# Patient Record
Sex: MALE | Race: WHITE | Employment: OTHER | URBAN - NONMETROPOLITAN AREA
[De-identification: names, ages, dates, MRNs, and addresses within clinical notes are randomized per-mention and may not be internally consistent; named-entity substitution may affect disease eponyms.]

---

## 2020-08-14 ENCOUNTER — HOSPITAL ENCOUNTER (INPATIENT)
Age: 85
LOS: 7 days | Discharge: NURSING, REHAB OR LONG-TERM CARE | DRG: 964 | End: 2020-08-21
Attending: FAMILY MEDICINE | Admitting: SURGERY
Payer: MEDICARE

## 2020-08-14 PROBLEM — R58 RETROPERITONEAL BLEEDING: Status: ACTIVE | Noted: 2020-08-14

## 2020-08-14 LAB
ABO: NORMAL
ACT TEG: 113 SECONDS (ref 86–118)
ANGLE, RAPID TEG: 75.8 DEG (ref 64–80)
ANION GAP SERPL CALCULATED.3IONS-SCNC: 12 MEQ/L (ref 8–16)
ANISOCYTOSIS: PRESENT
ANTIBODY SCREEN: NORMAL
APTT: 30.4 SECONDS (ref 22–38)
BASOPHILIA: ABNORMAL
BASOPHILS # BLD: 0.3 %
BASOPHILS ABSOLUTE: 0 THOU/MM3 (ref 0–0.1)
BUN BLDV-MCNC: 49 MG/DL (ref 7–22)
CALCIUM SERPL-MCNC: 7.5 MG/DL (ref 8.5–10.5)
CHLORIDE BLD-SCNC: 108 MEQ/L (ref 98–111)
CO2: 22 MEQ/L (ref 23–33)
CREAT SERPL-MCNC: 2.3 MG/DL (ref 0.4–1.2)
CRENATED RBC'S: ABNORMAL
DACROCYTES: ABNORMAL
EOSINOPHIL # BLD: 0 %
EOSINOPHILS ABSOLUTE: 0 THOU/MM3 (ref 0–0.4)
EPL-TEG: 0 % (ref 0–15)
ERYTHROCYTE [DISTWIDTH] IN BLOOD BY AUTOMATED COUNT: 102.4 FL (ref 35–45)
ERYTHROCYTE [DISTWIDTH] IN BLOOD BY AUTOMATED COUNT: 26.1 % (ref 11.5–14.5)
GFR SERPL CREATININE-BSD FRML MDRD: 27 ML/MIN/1.73M2
GLUCOSE BLD-MCNC: 123 MG/DL (ref 70–108)
HCT VFR BLD CALC: 23.7 % (ref 42–52)
HCT VFR BLD CALC: 24.3 % (ref 42–52)
HEMOGLOBIN: 7.3 GM/DL (ref 14–18)
HEMOGLOBIN: 7.4 GM/DL (ref 14–18)
HEPARIN THERAPY: NO
HYPOCHROMIA: PRESENT
IMMATURE GRANS (ABS): 0.07 THOU/MM3 (ref 0–0.07)
IMMATURE GRANULOCYTES: 0.6 %
INR BLD: 1.51 (ref 0.85–1.13)
KINETICS RAPID TEG: 1.2 MINUTES (ref 0.5–2.3)
LACTIC ACID: 2.2 MMOL/L (ref 0.5–2.2)
LY30 (LYSIS) TEG: 0 % (ref 0–7.5)
LYMPHOCYTES # BLD: 4.7 %
LYMPHOCYTES ABSOLUTE: 0.5 THOU/MM3 (ref 1–4.8)
MA(MAX CLOT) RAPID TEG: 63.1 MM (ref 52–71)
MCH RBC QN AUTO: 32.9 PG (ref 26–33)
MCHC RBC AUTO-ENTMCNC: 30.8 GM/DL (ref 32.2–35.5)
MCV RBC AUTO: 106.8 FL (ref 80–94)
MONOCYTES # BLD: 11.6 %
MONOCYTES ABSOLUTE: 1.3 THOU/MM3 (ref 0.4–1.3)
MRSA SCREEN RT-PCR: NEGATIVE
NUCLEATED RED BLOOD CELLS: 0 /100 WBC
OSMOLALITY CALCULATION: 297.5 MOSMOL/KG (ref 275–300)
PLATELET # BLD: 160 THOU/MM3 (ref 130–400)
PLATELET ESTIMATE: ADEQUATE
PMV BLD AUTO: 12.2 FL (ref 9.4–12.4)
POTASSIUM SERPL-SCNC: 4.6 MEQ/L (ref 3.5–5.2)
RBC # BLD: 2.22 MILL/MM3 (ref 4.7–6.1)
REACTION TIME RAPID TEG: 0.7 MINUTES (ref 0.4–1)
RH FACTOR: NORMAL
SCAN OF BLOOD SMEAR: NORMAL
SEG NEUTROPHILS: 82.8 %
SEGMENTED NEUTROPHILS ABSOLUTE COUNT: 9 THOU/MM3 (ref 1.8–7.7)
SODIUM BLD-SCNC: 142 MEQ/L (ref 135–145)
VANCOMYCIN RESISTANT ENTEROCOCCUS: NEGATIVE
WBC # BLD: 10.9 THOU/MM3 (ref 4.8–10.8)

## 2020-08-14 PROCEDURE — 86901 BLOOD TYPING SEROLOGIC RH(D): CPT

## 2020-08-14 PROCEDURE — 99222 1ST HOSP IP/OBS MODERATE 55: CPT | Performed by: SURGERY

## 2020-08-14 PROCEDURE — 86900 BLOOD TYPING SEROLOGIC ABO: CPT

## 2020-08-14 PROCEDURE — 86850 RBC ANTIBODY SCREEN: CPT

## 2020-08-14 PROCEDURE — 6820000003 HC L2 TRAUMA ALERT ACTIVATION: Performed by: SURGERY

## 2020-08-14 PROCEDURE — 80048 BASIC METABOLIC PNL TOTAL CA: CPT

## 2020-08-14 PROCEDURE — 86923 COMPATIBILITY TEST ELECTRIC: CPT

## 2020-08-14 PROCEDURE — 85025 COMPLETE CBC W/AUTO DIFF WBC: CPT

## 2020-08-14 PROCEDURE — APPSS180 APP SPLIT SHARED TIME > 60 MINUTES: Performed by: PHYSICIAN ASSISTANT

## 2020-08-14 PROCEDURE — 2500000003 HC RX 250 WO HCPCS: Performed by: PHYSICIAN ASSISTANT

## 2020-08-14 PROCEDURE — 2580000003 HC RX 258: Performed by: FAMILY MEDICINE

## 2020-08-14 PROCEDURE — 85014 HEMATOCRIT: CPT

## 2020-08-14 PROCEDURE — 2580000003 HC RX 258: Performed by: PHYSICIAN ASSISTANT

## 2020-08-14 PROCEDURE — 83605 ASSAY OF LACTIC ACID: CPT

## 2020-08-14 PROCEDURE — 87081 CULTURE SCREEN ONLY: CPT

## 2020-08-14 PROCEDURE — 6360000002 HC RX W HCPCS

## 2020-08-14 PROCEDURE — 85730 THROMBOPLASTIN TIME PARTIAL: CPT

## 2020-08-14 PROCEDURE — 99285 EMERGENCY DEPT VISIT HI MDM: CPT

## 2020-08-14 PROCEDURE — P9016 RBC LEUKOCYTES REDUCED: HCPCS

## 2020-08-14 PROCEDURE — 87500 VANOMYCIN DNA AMP PROBE: CPT

## 2020-08-14 PROCEDURE — 36415 COLL VENOUS BLD VENIPUNCTURE: CPT

## 2020-08-14 PROCEDURE — 2000000000 HC ICU R&B

## 2020-08-14 PROCEDURE — 76705 ECHO EXAM OF ABDOMEN: CPT

## 2020-08-14 PROCEDURE — 87086 URINE CULTURE/COLONY COUNT: CPT

## 2020-08-14 PROCEDURE — 85018 HEMOGLOBIN: CPT

## 2020-08-14 PROCEDURE — 99222 1ST HOSP IP/OBS MODERATE 55: CPT | Performed by: INTERNAL MEDICINE

## 2020-08-14 PROCEDURE — 85610 PROTHROMBIN TIME: CPT

## 2020-08-14 PROCEDURE — 87641 MR-STAPH DNA AMP PROBE: CPT

## 2020-08-14 RX ORDER — POLYETHYLENE GLYCOL 3350 17 G/17G
17 POWDER, FOR SOLUTION ORAL DAILY
Status: ON HOLD | COMMUNITY
End: 2020-08-21 | Stop reason: HOSPADM

## 2020-08-14 RX ORDER — DOCUSATE SODIUM 100 MG/1
100 CAPSULE, LIQUID FILLED ORAL DAILY
Status: DISCONTINUED | OUTPATIENT
Start: 2020-08-15 | End: 2020-08-21 | Stop reason: HOSPADM

## 2020-08-14 RX ORDER — LEVOTHYROXINE SODIUM 0.1 MG/1
100 TABLET ORAL DAILY
Status: DISCONTINUED | OUTPATIENT
Start: 2020-08-15 | End: 2020-08-21 | Stop reason: HOSPADM

## 2020-08-14 RX ORDER — WARFARIN SODIUM 5 MG/1
5 TABLET ORAL DAILY
Status: ON HOLD | COMMUNITY
End: 2020-08-21 | Stop reason: HOSPADM

## 2020-08-14 RX ORDER — SODIUM CHLORIDE 0.9 % (FLUSH) 0.9 %
10 SYRINGE (ML) INJECTION EVERY 12 HOURS SCHEDULED
Status: DISCONTINUED | OUTPATIENT
Start: 2020-08-14 | End: 2020-08-21 | Stop reason: HOSPADM

## 2020-08-14 RX ORDER — ALLOPURINOL 100 MG/1
100 TABLET ORAL DAILY
Status: DISCONTINUED | OUTPATIENT
Start: 2020-08-15 | End: 2020-08-21 | Stop reason: HOSPADM

## 2020-08-14 RX ORDER — POTASSIUM CITRATE 10 MEQ/1
10 TABLET, EXTENDED RELEASE ORAL DAILY
Status: ON HOLD | COMMUNITY
End: 2020-08-21 | Stop reason: HOSPADM

## 2020-08-14 RX ORDER — ATORVASTATIN CALCIUM 20 MG/1
20 TABLET, FILM COATED ORAL DAILY
Status: DISCONTINUED | OUTPATIENT
Start: 2020-08-15 | End: 2020-08-21 | Stop reason: HOSPADM

## 2020-08-14 RX ORDER — FENTANYL CITRATE 50 UG/ML
25 INJECTION, SOLUTION INTRAMUSCULAR; INTRAVENOUS
Status: DISCONTINUED | OUTPATIENT
Start: 2020-08-14 | End: 2020-08-21 | Stop reason: HOSPADM

## 2020-08-14 RX ORDER — FENTANYL CITRATE 50 UG/ML
INJECTION, SOLUTION INTRAMUSCULAR; INTRAVENOUS
Status: COMPLETED
Start: 2020-08-14 | End: 2020-08-14

## 2020-08-14 RX ORDER — ASPIRIN 81 MG/1
81 TABLET ORAL NIGHTLY
Status: ON HOLD | COMMUNITY
End: 2020-08-21 | Stop reason: HOSPADM

## 2020-08-14 RX ORDER — MECLIZINE HCL 12.5 MG/1
12.5 TABLET ORAL 3 TIMES DAILY PRN
COMMUNITY

## 2020-08-14 RX ORDER — SODIUM CHLORIDE 9 MG/ML
INJECTION, SOLUTION INTRAVENOUS CONTINUOUS
Status: DISCONTINUED | OUTPATIENT
Start: 2020-08-14 | End: 2020-08-16

## 2020-08-14 RX ORDER — ACETAMINOPHEN 325 MG/1
650 TABLET ORAL EVERY 4 HOURS PRN
Status: DISCONTINUED | OUTPATIENT
Start: 2020-08-14 | End: 2020-08-21 | Stop reason: HOSPADM

## 2020-08-14 RX ORDER — DOCUSATE SODIUM 100 MG/1
100 CAPSULE, LIQUID FILLED ORAL DAILY
Status: ON HOLD | COMMUNITY
End: 2020-08-21 | Stop reason: HOSPADM

## 2020-08-14 RX ORDER — LEVOTHYROXINE SODIUM 0.1 MG/1
100 TABLET ORAL DAILY
Status: ON HOLD | COMMUNITY
End: 2020-08-21 | Stop reason: HOSPADM

## 2020-08-14 RX ORDER — SODIUM CHLORIDE 0.9 % (FLUSH) 0.9 %
10 SYRINGE (ML) INJECTION PRN
Status: DISCONTINUED | OUTPATIENT
Start: 2020-08-14 | End: 2020-08-21 | Stop reason: HOSPADM

## 2020-08-14 RX ORDER — PROMETHAZINE HYDROCHLORIDE 25 MG/1
12.5 TABLET ORAL EVERY 6 HOURS PRN
Status: DISCONTINUED | OUTPATIENT
Start: 2020-08-14 | End: 2020-08-21 | Stop reason: HOSPADM

## 2020-08-14 RX ORDER — BUMETANIDE 2 MG/1
2 TABLET ORAL
Status: ON HOLD | COMMUNITY
End: 2020-08-21 | Stop reason: HOSPADM

## 2020-08-14 RX ORDER — POLYETHYLENE GLYCOL 3350 17 G/17G
17 POWDER, FOR SOLUTION ORAL DAILY PRN
Status: DISCONTINUED | OUTPATIENT
Start: 2020-08-14 | End: 2020-08-21 | Stop reason: HOSPADM

## 2020-08-14 RX ORDER — ZOLPIDEM TARTRATE 5 MG/1
5 TABLET ORAL NIGHTLY
Status: ON HOLD | COMMUNITY
End: 2020-08-21 | Stop reason: HOSPADM

## 2020-08-14 RX ORDER — ACETAMINOPHEN 500 MG
500 TABLET ORAL EVERY 6 HOURS PRN
Status: DISCONTINUED | OUTPATIENT
Start: 2020-08-14 | End: 2020-08-14 | Stop reason: ALTCHOICE

## 2020-08-14 RX ORDER — AMIODARONE HYDROCHLORIDE 200 MG/1
400 TABLET ORAL DAILY
COMMUNITY

## 2020-08-14 RX ORDER — ALLOPURINOL 100 MG/1
100 TABLET ORAL DAILY
COMMUNITY

## 2020-08-14 RX ORDER — FENTANYL CITRATE 50 UG/ML
25 INJECTION, SOLUTION INTRAMUSCULAR; INTRAVENOUS ONCE
Status: COMPLETED | OUTPATIENT
Start: 2020-08-14 | End: 2020-08-14

## 2020-08-14 RX ORDER — NITROGLYCERIN 0.4 MG/1
0.4 TABLET SUBLINGUAL EVERY 5 MIN PRN
COMMUNITY

## 2020-08-14 RX ORDER — AMIODARONE HYDROCHLORIDE 200 MG/1
400 TABLET ORAL DAILY
Status: DISCONTINUED | OUTPATIENT
Start: 2020-08-15 | End: 2020-08-21 | Stop reason: HOSPADM

## 2020-08-14 RX ORDER — ACETAMINOPHEN 500 MG
500 TABLET ORAL EVERY 6 HOURS PRN
COMMUNITY

## 2020-08-14 RX ORDER — SODIUM CHLORIDE 9 MG/ML
INJECTION, SOLUTION INTRAVENOUS CONTINUOUS
Status: DISCONTINUED | OUTPATIENT
Start: 2020-08-14 | End: 2020-08-14

## 2020-08-14 RX ORDER — ATORVASTATIN CALCIUM 20 MG/1
20 TABLET, FILM COATED ORAL DAILY
COMMUNITY

## 2020-08-14 RX ORDER — ONDANSETRON 2 MG/ML
4 INJECTION INTRAMUSCULAR; INTRAVENOUS EVERY 6 HOURS PRN
Status: DISCONTINUED | OUTPATIENT
Start: 2020-08-14 | End: 2020-08-21 | Stop reason: HOSPADM

## 2020-08-14 RX ORDER — BUMETANIDE 1 MG/1
1 TABLET ORAL
Status: ON HOLD | COMMUNITY
End: 2020-08-21 | Stop reason: HOSPADM

## 2020-08-14 RX ADMIN — FENTANYL CITRATE 25 MCG: 50 INJECTION, SOLUTION INTRAMUSCULAR; INTRAVENOUS at 16:03

## 2020-08-14 RX ADMIN — SODIUM CHLORIDE: 9 INJECTION, SOLUTION INTRAVENOUS at 16:01

## 2020-08-14 RX ADMIN — SODIUM CHLORIDE: 9 INJECTION, SOLUTION INTRAVENOUS at 18:02

## 2020-08-14 RX ADMIN — FAMOTIDINE 20 MG: 10 INJECTION INTRAVENOUS at 21:00

## 2020-08-14 RX ADMIN — Medication 10 ML: at 21:00

## 2020-08-14 ASSESSMENT — ENCOUNTER SYMPTOMS
SHORTNESS OF BREATH: 0
ABDOMINAL PAIN: 1

## 2020-08-14 ASSESSMENT — PAIN SCALES - GENERAL
PAINLEVEL_OUTOF10: 5
PAINLEVEL_OUTOF10: 0

## 2020-08-14 NOTE — ED PROVIDER NOTES
history on file. SURGICAL HISTORY      has no past surgical history on file. CURRENT MEDICATIONS       Current Discharge Medication List      CONTINUE these medications which have NOT CHANGED    Details   polyethylene glycol (GLYCOLAX) 17 g packet Take 17 g by mouth daily      allopurinol (ZYLOPRIM) 100 MG tablet Take 100 mg by mouth daily      atorvastatin (LIPITOR) 20 MG tablet Take 20 mg by mouth daily      metoprolol tartrate (LOPRESSOR) 25 MG tablet Take 25 mg by mouth 2 times daily Hold if SBP <100 or DBP<60      docusate sodium (COLACE) 100 MG capsule Take 100 mg by mouth daily      levothyroxine (SYNTHROID) 100 MCG tablet Take 100 mcg by mouth Daily      acetaminophen (TYLENOL) 500 MG tablet Take 500 mg by mouth every 6 hours as needed for Pain      meclizine (ANTIVERT) 12.5 MG tablet Take 12.5 mg by mouth 3 times daily as needed for Dizziness      warfarin (COUMADIN) 5 MG tablet Take 5 mg by mouth daily      !! bumetanide (BUMEX) 2 MG tablet Take 2 mg by mouth daily      !! bumetanide (BUMEX) 1 MG tablet Take 1 mg by mouth Daily with lunch      zolpidem (AMBIEN) 5 MG tablet Take 5 mg by mouth nightly. nitroGLYCERIN (NITROSTAT) 0.4 MG SL tablet Place 0.4 mg under the tongue every 5 minutes as needed for Chest pain up to max of 3 total doses. If no relief after 1 dose, call 911. potassium citrate (UROCIT-K) 10 MEQ (1080 MG) extended release tablet Take 10 mEq by mouth daily      amiodarone (CORDARONE) 200 MG tablet Take 400 mg by mouth daily      aspirin 81 MG EC tablet Take 81 mg by mouth nightly       !! - Potential duplicate medications found. Please discuss with provider. ALLERGIES     has no allergies on file. FAMILY HISTORY     has no family status information on file. family history is not on file. SOCIAL HISTORY          PHYSICAL EXAM     INITIAL VITALS:  height is 6' (1.829 m) and weight is 212 lb 4.9 oz (96.3 kg). His rectal temperature is 95.7 °F (35.4 °C).  His blood pressure is 105/60 and his pulse is 80. His respiration is 19 and oxygen saturation is 100%. Physical Exam  Vitals signs and nursing note reviewed. Constitutional:       Comments: GCS 15   HENT:      Head: Normocephalic and atraumatic. Eyes:      Extraocular Movements: Extraocular movements intact. Pupils: Pupils are equal, round, and reactive to light. Neck:      Musculoskeletal: Normal range of motion and neck supple. No muscular tenderness. Cardiovascular:      Rate and Rhythm: Normal rate and regular rhythm. Pulses: Normal pulses. Heart sounds: Normal heart sounds. Pulmonary:      Effort: Pulmonary effort is normal.      Breath sounds: Normal breath sounds. Comments: He has some ecchymoses in the left chest but not tender to palpate  Chest:      Chest wall: No tenderness. Abdominal:      Palpations: Abdomen is soft. Tenderness: There is abdominal tenderness. Comments: Some tenderness toward the right lower abdomen and flank   Musculoskeletal:         General: No swelling. Skin:     General: Skin is warm and dry. Coloration: Skin is pale. Neurological:      General: No focal deficit present. Mental Status: He is alert. Psychiatric:         Mood and Affect: Mood normal.         Behavior: Behavior normal.           DIFFERENTIAL DIAGNOSIS:     Prolonged INR with retroperitoneal hemorrhage on the right.   Onset today    Hypotension noted at outlying ER    DIAGNOSTIC RESULTS         LABS:   Labs Reviewed   CBC WITH AUTO DIFFERENTIAL - Abnormal; Notable for the following components:       Result Value    WBC 10.9 (*)     RBC 2.22 (*)     Hemoglobin 7.3 (*)     Hematocrit 23.7 (*)     .8 (*)     MCHC 30.8 (*)     RDW-CV 26.1 (*)     RDW-.4 (*)     Segs Absolute 9.0 (*)     Lymphocytes Absolute 0.5 (*)     All other components within normal limits   PROTIME-INR - Abnormal; Notable for the following components:    INR 1.51 (*)     All other components within normal limits   BASIC METABOLIC PANEL - Abnormal; Notable for the following components:    CO2 22 (*)     Glucose 123 (*)     BUN 49 (*)     CREATININE 2.3 (*)     Calcium 7.5 (*)     All other components within normal limits   GLOMERULAR FILTRATION RATE, ESTIMATED - Abnormal; Notable for the following components:    Est, Glom Filt Rate 27 (*)     All other components within normal limits   CULTURE, MRSA, SCREENING    Narrative:     Epic Plan - [id number]   VRE SCREEN BY PCR    Narrative:     Epic Plan - [id number]   CULTURE, URINE    Narrative:     Epic Plan - [id number]   APTT   TEG, RAPID CITRATED   LACTIC ACID, PLASMA   ANION GAP   OSMOLALITY   SCAN OF BLOOD SMEAR   MRSA BY PCR    Narrative:     Epic Plan - [id number]   HEMOGLOBIN AND HEMATOCRIT, BLOOD   HEMOGLOBIN AND HEMATOCRIT, BLOOD   COMPREHENSIVE METABOLIC PANEL W/ REFLEX TO MG FOR LOW K   LACTIC ACID, PLASMA   CBC WITH AUTO DIFFERENTIAL   PROTIME-INR   APTT   HEMOGLOBIN AND HEMATOCRIT, BLOOD    Narrative:     Epic Plan - [id number]   TYPE AND SCREEN       EMERGENCY DEPARTMENT COURSE:   Vitals:    Vitals:    08/14/20 1546 08/14/20 1607 08/14/20 1638 08/14/20 1713   BP: 108/60 (!) 102/56 (!) 102/51 105/60   Pulse: 80 80 80 80   Resp: 18 17 18 19   Temp:    95.7 °F (35.4 °C)   TempSrc:    Rectal   SpO2: 99% 100% 100% 100%   Weight:    212 lb 4.9 oz (96.3 kg)   Height:         Nursing notes reviewed    Consider level 1 trauma activation by protocol as he was receiving blood in route    Patient is hemodynamically stable on arrival    Blood pressure 102/61 with pulse 80    He is paced at 80.     He was seen by Dr. Ceferino Suazo and trauma physician assistant    Hemoglobin 7.3    INR 1.51    BUN 49, creatinine 2.3 somewhat improved from previous ER numbers    At this point no further imaging was done    He will be admitted to trauma services will monitor his hemoglobin and determine need for further transfusion        CRITICAL CARE:   none    CONSULTS:   Jaxon    PROCEDURES:  None    FINAL IMPRESSION      1. Retroperitoneal bleeding    2.  Prolonged INR          DISPOSITION/PLAN   admit    PATIENT REFERRED TO:  Amandeep Luzgraciela   [phone]            DISCHARGE MEDICATIONS:  Current Discharge Medication List          (Please note that portions of this note were completed with a voice recognition program.  Efforts were made to edit the dictations but occasionally words are mis-transcribed.)    MD Hanna Persaud MD  08/14/20 9372

## 2020-08-14 NOTE — ED NOTES
Patient medicated per [facility] for pain 5/10, patient tolerated well. Pt covered in warm blankets and resting comfortably, pt respirations are easy and unlabored.       Myron GORDILLO RN  08/14/20 0644

## 2020-08-14 NOTE — ED NOTES
Bed: 004A  Expected date:   Expected time:   Means of arrival:   Comments:  Level [address], RN  08/14/20 0904

## 2020-08-14 NOTE — PROGRESS NOTES
Pharmacy Medication History Note      List of current medications patient is taking is complete. Source of information: Med list    Changes made to medication list:  Medications removed (include reason, ex. therapy complete or physician discontinued):        Medications added/doses adjusted: Other notes (ex. Recent course of antibiotics, Coumadin dosing):    Denies use of other OTC or herbal medications.       Allergies reviewed      Electronically signed by Erasmo Cedeno [facility] on 8/14/2020 at 4:36 PM

## 2020-08-14 NOTE — H&P
Trauma H&P     Patient:  Bijan Chung  Admit date: 8/14/2020   YOB: 1930 Date of Evaluation: 8/14/2020  MRN: [id number]  Acct: [de-identified]    Injury Date:8/14/2020  Injury time: Unknown  PCP: Anatoly Ro   Referring physician: Dr. Barbara Tang    Time of Trauma Surgeon Notification:  [phone] August 14, 2020  Time of OLENA Arrival:1515  Time of Trauma Surgeon Arrival:  4364 August 14, 2020    Assessment:    1. Status post fall  2. Subcapsular renal and retroperitoneal hematoma   3. Hypotension  4. Acute blood loss anemia  5. Acute on chronic kidney injury  6. Hypocalcemia  7. Coagulopathy  Plan:    Patient admitted under Trauma Services w/ tele to ICU    Retroperitoneal blood   - Consult to Intensivist for management of chronic conditions and hypotension   - Consult to nephrology for elevated BUN and Creatinine    - Serial H&H   - Vital monitoring   - Transfuse for hemoglobin less than 7, given 1 unit PRBC   - Hgb 7.3 on admission   - Given Kcentra and vitamin K   - INR 1.51 on admission.     - TEG parameters within normal range    Leukocytosis   - 10.9 on admission   - Likely reactive    Acute blood loss anemia   - Monitor hemoglobins   - Transfuse as necessary    Consults: Intensivist, nephrology    Pain Management   -fentanyl    Prophylaxis: SCD's, Incentive Spirometry, Colace, Pepcid, Zofran    NPO, resume general diet if remains stable overnight    Gentle IVF Management  Regular Neurovascular Checks  Repeat Labs Tomorrow AM  PT/OT/SLP Eval and Treat as appropriate  Strict bedrest    Planned Discharge pending clinical course      Activation: [x]Level I (Trauma Alert) []Level II (Injury Call) []Level III (Trauma Consult) [] Downgraded (Time: )   Mode of Arrival: EMS transportation  Referring Facility: [facility]  Loss of Consciousness []No []Yes[]Unknown  Duration(min)  Mechanism of Injury:  []Motor Vehicle crash   []Single Vehicle [] []Passenger []Scene Fatality []Front Seat []Restrained   []Air Bag Deployed   []Ejected []Rollover []Pedestrian []Trapped   Type of vehicle:   Protective Devices:   []Motorcycle  Wearing Helmet []Yes []No  []Bicycle  Wearing Helmet []Yes []No  []Fall   Distance    []Assault    Abuse Reported []Yes []No  []Gunshot  []Stabbing  []Work Related  []Burn: []Flame []Scald []Electrical []Chemical []Contact []Inhalation []House Fire  [x]Other: Retroperitoneal bleeding from possible fall 12 days ago vs unknown origin  Patient Active Problem List   Diagnosis    Retroperitoneal bleeding     Subjective   Chief Complaint: Right lower abdominal pain    History of Present Illness:    Mr. Goldy Ryenolds is a 79 Y/O male presenting at [facility] by activation of level I trauma, brought by EMS transfer from [facility] following confirmed retroperitoneal bleeding identified on CT without active extravigation, past medical history includes CAD, Afib? On warfain. Per EMS report states INR over 3.0, had fallen 12 days prior, new onset right abdominal pain thismorning caused patient to go to ED where he became hypotensive and CT scan performed. Given Kcentra, vitamin K, 1 unit PRBC, and 3 L normal saline prior to arrival at [facility]. Patient reports right lower abdominal pain worse with palpation, patient does not recall injury to right side of body in recent past. Patient denies neck pain, abdominal pain, SOB, chest pain, and nausea. Patient admitted to trauma services for close monitoring of hemoglobin and transfusions as necessary. Care in coordination with trauma surgeon Dr. Lin Dan. Review of Systems:   Review of Systems  All systems were reviewed and negative other than HPI  Patient has no allergy information on record. No past surgical history on file. No past medical history on file. No past surgical history on file.   Social History     Socioeconomic History    Marital status: Single     Spouse name: Not on file    Number of children: Not on file    Years of education: Not on file    Highest education level: Not on file   Occupational History    Not on file   Social Needs    Financial resource strain: Not on file    Food insecurity     Worry: Not on file     Inability: Not on file    Transportation needs     Medical: Not on file     Non-medical: Not on file   Tobacco Use    Smoking status: Not on file   Substance and Sexual Activity    Alcohol use: Not on file    Drug use: Not on file    Sexual activity: Not on file   Lifestyle    Physical activity     Days per week: Not on file     Minutes per session: Not on file    Stress: Not on file   Relationships    Social connections     Talks on phone: Not on file     Gets together: Not on file     Attends Mosque service: Not on file     Active member of club or organization: Not on file     Attends meetings of clubs or organizations: Not on file     Relationship status: Not on file    Intimate partner violence     Fear of current or ex partner: Not on file     Emotionally abused: Not on file     Physically abused: Not on file     Forced sexual activity: Not on file   Other Topics Concern    Not on file   Social History Narrative    Not on file     No family history on file.     Home medications:    Current Discharge Medication List      CONTINUE these medications which have NOT CHANGED    Details   polyethylene glycol (GLYCOLAX) 17 g packet Take 17 g by mouth daily      allopurinol (ZYLOPRIM) 100 MG tablet Take 100 mg by mouth daily      atorvastatin (LIPITOR) 20 MG tablet Take 20 mg by mouth daily      metoprolol tartrate (LOPRESSOR) 25 MG tablet Take 25 mg by mouth 2 times daily Hold if SBP <100 or DBP<60      docusate sodium (COLACE) 100 MG capsule Take 100 mg by mouth daily      levothyroxine (SYNTHROID) 100 MCG tablet Take 100 mcg by mouth Daily      acetaminophen (TYLENOL) 500 MG tablet Take 500 mg by mouth every 6 hours as needed for Pain      meclizine (ANTIVERT) 12.5 MG tablet Take 12.5 mg by mouth 3 times daily as needed for Dizziness      warfarin (COUMADIN) 5 MG tablet Take 5 mg by mouth daily      !! bumetanide (BUMEX) 2 MG tablet Take 2 mg by mouth daily      !! bumetanide (BUMEX) 1 MG tablet Take 1 mg by mouth Daily with lunch      zolpidem (AMBIEN) 5 MG tablet Take 5 mg by mouth nightly. nitroGLYCERIN (NITROSTAT) 0.4 MG SL tablet Place 0.4 mg under the tongue every 5 minutes as needed for Chest pain up to max of 3 total doses. If no relief after 1 dose, call 911. potassium citrate (UROCIT-K) 10 MEQ (1080 MG) extended release tablet Take 10 mEq by mouth daily      amiodarone (CORDARONE) 200 MG tablet Take 400 mg by mouth daily      aspirin 81 MG EC tablet Take 81 mg by mouth nightly       !! - Potential duplicate medications found. Please discuss with provider.           Hospital medications:  Scheduled Meds:   sodium chloride flush  10 mL Intravenous 2 times per day    famotidine (PEPCID) injection  20 mg Intravenous BID     Continuous Infusions:   sodium chloride 100 mL/hr at 08/14/20 1802    sodium chloride       PRN Meds:sodium chloride flush, acetaminophen, polyethylene glycol, promethazine **OR** ondansetron, fentanNYL  Objective   ED TRIAGE VITALS  BP: 105/60, Temp: 95.7 °F (35.4 °C), Pulse: 80, Resp: 19, SpO2: 100 %  [place] Coma Scale  Eye Opening: Spontaneous  Best Verbal Response: Oriented  Best Motor Response: Obeys commands  [place] Coma Scale Score: 15  Results for orders placed or performed during the hospital encounter of 08/14/20   CBC auto differential   Result Value Ref Range    WBC 10.9 (H) 4.8 - 10.8 thou/mm3    RBC 2.22 (L) 4.70 - 6.10 mill/mm3    Hemoglobin 7.3 (L) 14.0 - 18.0 gm/dl    Hematocrit 23.7 (L) 42.0 - 52.0 %    .8 (H) 80.0 - 94.0 fL    MCH 32.9 26.0 - 33.0 pg    MCHC 30.8 (L) 32.2 - 35.5 gm/dl    RDW-CV 26.1 (H) 11.5 - 14.5 %    RDW-.4 (H) 35.0 - 45.0 fL    Platelets 916 579 - 598 thou/mm3    MPV 12.2 9.4 - 12.4 fL    Seg Neutrophils 82.8 % Lymphocytes 4.7 %    Monocytes 11.6 %    Eosinophils 0.0 %    Basophils 0.3 %    Immature Granulocytes 0.6 %    Platelet Estimate ADEQUATE Adequate    Segs Absolute 9.0 (H) 1.8 - 7.7 thou/mm3    Lymphocytes Absolute 0.5 (L) 1.0 - 4.8 thou/mm3    Monocytes Absolute 1.3 0.4 - 1.3 thou/mm3    Eosinophils Absolute 0.0 0.0 - 0.4 thou/mm3    Basophils Absolute 0.0 0.0 - 0.1 thou/mm3    Immature Grans (Abs) 0.07 0.00 - 0.07 thou/mm3    nRBC 0 /100 wbc    Anisocytosis PRESENT Absent    BASOPHILIA 1+ Absent    CRENATED RBC'S 1+ Absent    Dacrocytes 1+ Absent    Hypochromia Present Absent   APTT   Result Value Ref Range    aPTT 30.4 22.0 - 38.0 seconds   Protime-INR   Result Value Ref Range    INR 1.51 (H) 0.85 - 2.99   Basic Metabolic Panel   Result Value Ref Range    Sodium 142 135 - 145 meq/L    Potassium 4.6 3.5 - 5.2 meq/L    Chloride 108 98 - 111 meq/L    CO2 22 (L) 23 - 33 meq/L    Glucose 123 (H) 70 - 108 mg/dL    BUN 49 (H) 7 - 22 mg/dL    CREATININE 2.3 (H) 0.4 - 1.2 mg/dL    Calcium 7.5 (L) 8.5 - 10.5 mg/dL   Rapid TEG   Result Value Ref Range    Heparin Therapy NO     ACT .0 86.0 - 118.0 seconds    Reaction Time Rapid TEG 0.7 0.4 - 1.0 Minutes    Kinetics Rapid TEG 1.2 0.5 - 2.3 Minutes    Angle, Rapid TEG 75.8 64.0 - 80.0 deg    MA(Max Clot) Rapid TEG 63.1 52.0 - 71.0 mm    LY30(Lysis) TEG 0.0 0.0 - 7.5 %    EPL-TEG 0.0 0.0 - 15.0 %   Lactic acid, plasma   Result Value Ref Range    Lactic Acid 2.2 0.5 - 2.2 mmol/L   Anion Gap   Result Value Ref Range    Anion Gap 12.0 8.0 - 16.0 meq/L   Glomerular Filtration Rate, Estimated   Result Value Ref Range    Est, Glom Filt Rate 27 (A) ml/min/1.73m2   Osmolality   Result Value Ref Range    Osmolality Calc 297.5 275.0 - 300 mOsmol/kg   Scan of Blood Smear   Result Value Ref Range    SCAN OF BLOOD SMEAR see below    TYPE AND SCREEN   Result Value Ref Range    ABO A     Rh Factor POS     Antibody Screen NEG        Physical Exam:  Patient Vitals for the past 24 hrs: percussion throughout. Soft and tender to palpation in right abdomen and flank. MSK: Extremities intact and present. No evident bruising, swelling, deformity, discoloration or bleeding. No tenderness to muscular or bony structure palpation. NEURO: Follows commands, reasoning intact, recalls recent events. PMS intact, moves limbs freely. SKIN: Pink, warm and dry. Radiology:     No orders to display     Fast Exam: No - diagnosis made by CT at external facility. Electronically signed by SUNIL Stephenson on 8/14/2020 at 6:11 PM    Patient seen and examined independently by me. Above discussed and I agree with SUNIL Stephenson. See my additional comments below for updated orders and plan. Labs, cultures, and radiographs where available were reviewed. I discussed patient concerns with the patient's nurse and instructions were given. Please see our orders for the updated patient care plan. -Serial hemoglobin/hematocrit checks. Monitor in the ICU overnight. Transfuse as needed. Repeat labs in a.m. Status post Kcentra and vitamin K.  TEG.  IV fluid hydration. Intensivist consultation. Nephrology evaluation secondary to acute on chronic kidney injury. Neurovascular checks. N.p.o.  SCDs for DVT prophylaxis. Replace electrolytes per protocol as needed. Bedrest overnight.     Electronically signed by Sandoval Figueroa MD on 8/14/20 at 11:19 PM EDT

## 2020-08-15 ENCOUNTER — APPOINTMENT (OUTPATIENT)
Dept: ULTRASOUND IMAGING | Age: 85
DRG: 964 | End: 2020-08-15
Payer: MEDICARE

## 2020-08-15 ENCOUNTER — APPOINTMENT (OUTPATIENT)
Dept: CT IMAGING | Age: 85
DRG: 964 | End: 2020-08-15
Payer: MEDICARE

## 2020-08-15 LAB
ALBUMIN SERPL-MCNC: 3.2 G/DL (ref 3.5–5.1)
ALP BLD-CCNC: 53 U/L (ref 38–126)
ALT SERPL-CCNC: 26 U/L (ref 11–66)
ANION GAP SERPL CALCULATED.3IONS-SCNC: 11 MEQ/L (ref 8–16)
ANISOCYTOSIS: PRESENT
APTT: 29.6 SECONDS (ref 22–38)
AST SERPL-CCNC: 31 U/L (ref 5–40)
BASOPHILS # BLD: 0.2 %
BASOPHILS ABSOLUTE: 0 THOU/MM3 (ref 0–0.1)
BILIRUB SERPL-MCNC: 2 MG/DL (ref 0.3–1.2)
BUN BLDV-MCNC: 52 MG/DL (ref 7–22)
CALCIUM SERPL-MCNC: 7.6 MG/DL (ref 8.5–10.5)
CHLORIDE BLD-SCNC: 113 MEQ/L (ref 98–111)
CO2: 20 MEQ/L (ref 23–33)
CREAT SERPL-MCNC: 2.5 MG/DL (ref 0.4–1.2)
CREATININE URINE: 102.8 MG/DL
EOSINOPHIL # BLD: 0 %
EOSINOPHILS ABSOLUTE: 0 THOU/MM3 (ref 0–0.4)
ERYTHROCYTE [DISTWIDTH] IN BLOOD BY AUTOMATED COUNT: 26.1 % (ref 11.5–14.5)
ERYTHROCYTE [DISTWIDTH] IN BLOOD BY AUTOMATED COUNT: 97.2 FL (ref 35–45)
GFR SERPL CREATININE-BSD FRML MDRD: 24 ML/MIN/1.73M2
GLUCOSE BLD-MCNC: 95 MG/DL (ref 70–108)
HCT VFR BLD CALC: 22.5 % (ref 42–52)
HCT VFR BLD CALC: 22.7 % (ref 42–52)
HCT VFR BLD CALC: 23.1 % (ref 42–52)
HCT VFR BLD CALC: 23.9 % (ref 42–52)
HEMOGLOBIN: 7 GM/DL (ref 14–18)
HEMOGLOBIN: 7.3 GM/DL (ref 14–18)
HEMOGLOBIN: 7.3 GM/DL (ref 14–18)
HEMOGLOBIN: 7.7 GM/DL (ref 14–18)
IMMATURE GRANS (ABS): 0.05 THOU/MM3 (ref 0–0.07)
IMMATURE GRANULOCYTES: 0.6 %
INR BLD: 1.42 (ref 0.85–1.13)
LACTIC ACID: 1.5 MMOL/L (ref 0.5–2.2)
LYMPHOCYTES # BLD: 5.9 %
LYMPHOCYTES ABSOLUTE: 0.5 THOU/MM3 (ref 1–4.8)
MCH RBC QN AUTO: 32.7 PG (ref 26–33)
MCHC RBC AUTO-ENTMCNC: 31.6 GM/DL (ref 32.2–35.5)
MCV RBC AUTO: 103.6 FL (ref 80–94)
MONOCYTES # BLD: 14.7 %
MONOCYTES ABSOLUTE: 1.3 THOU/MM3 (ref 0.4–1.3)
NUCLEATED RED BLOOD CELLS: 0 /100 WBC
PLATELET # BLD: 121 THOU/MM3 (ref 130–400)
PMV BLD AUTO: 12.6 FL (ref 9.4–12.4)
POTASSIUM REFLEX MAGNESIUM: 4.8 MEQ/L (ref 3.5–5.2)
POTASSIUM SERPL-SCNC: 4.8 MEQ/L (ref 3.5–5.2)
RBC # BLD: 2.23 MILL/MM3 (ref 4.7–6.1)
SEG NEUTROPHILS: 78.6 %
SEGMENTED NEUTROPHILS ABSOLUTE COUNT: 7.2 THOU/MM3 (ref 1.8–7.7)
SODIUM BLD-SCNC: 144 MEQ/L (ref 135–145)
SODIUM URINE: 24 MEQ/L
TOTAL PROTEIN: 4.7 G/DL (ref 6.1–8)
WBC # BLD: 9.1 THOU/MM3 (ref 4.8–10.8)

## 2020-08-15 PROCEDURE — 2580000003 HC RX 258: Performed by: INTERNAL MEDICINE

## 2020-08-15 PROCEDURE — 85610 PROTHROMBIN TIME: CPT

## 2020-08-15 PROCEDURE — 92526 ORAL FUNCTION THERAPY: CPT | Performed by: SPEECH-LANGUAGE PATHOLOGIST

## 2020-08-15 PROCEDURE — 6370000000 HC RX 637 (ALT 250 FOR IP): Performed by: INTERNAL MEDICINE

## 2020-08-15 PROCEDURE — P9016 RBC LEUKOCYTES REDUCED: HCPCS

## 2020-08-15 PROCEDURE — 85730 THROMBOPLASTIN TIME PARTIAL: CPT

## 2020-08-15 PROCEDURE — 36430 TRANSFUSION BLD/BLD COMPNT: CPT

## 2020-08-15 PROCEDURE — 36415 COLL VENOUS BLD VENIPUNCTURE: CPT

## 2020-08-15 PROCEDURE — 84300 ASSAY OF URINE SODIUM: CPT

## 2020-08-15 PROCEDURE — 2580000003 HC RX 258: Performed by: PHYSICIAN ASSISTANT

## 2020-08-15 PROCEDURE — 3209999900 CT INTERPRETATION OF OUTSIDE IMAGES

## 2020-08-15 PROCEDURE — 6370000000 HC RX 637 (ALT 250 FOR IP): Performed by: NURSE PRACTITIONER

## 2020-08-15 PROCEDURE — 83605 ASSAY OF LACTIC ACID: CPT

## 2020-08-15 PROCEDURE — 80053 COMPREHEN METABOLIC PANEL: CPT

## 2020-08-15 PROCEDURE — APPSS180 APP SPLIT SHARED TIME > 60 MINUTES: Performed by: NURSE PRACTITIONER

## 2020-08-15 PROCEDURE — 2500000003 HC RX 250 WO HCPCS: Performed by: PHYSICIAN ASSISTANT

## 2020-08-15 PROCEDURE — APPSS45 APP SPLIT SHARED TIME 31-45 MINUTES: Performed by: NURSE PRACTITIONER

## 2020-08-15 PROCEDURE — 82570 ASSAY OF URINE CREATININE: CPT

## 2020-08-15 PROCEDURE — APPSS60 APP SPLIT SHARED TIME 46-60 MINUTES: Performed by: PHYSICIAN ASSISTANT

## 2020-08-15 PROCEDURE — 92610 EVALUATE SWALLOWING FUNCTION: CPT | Performed by: SPEECH-LANGUAGE PATHOLOGIST

## 2020-08-15 PROCEDURE — 6370000000 HC RX 637 (ALT 250 FOR IP): Performed by: PHYSICIAN ASSISTANT

## 2020-08-15 PROCEDURE — 99233 SBSQ HOSP IP/OBS HIGH 50: CPT | Performed by: INTERNAL MEDICINE

## 2020-08-15 PROCEDURE — 85018 HEMOGLOBIN: CPT

## 2020-08-15 PROCEDURE — 2060000000 HC ICU INTERMEDIATE R&B

## 2020-08-15 PROCEDURE — 6360000002 HC RX W HCPCS: Performed by: PHYSICIAN ASSISTANT

## 2020-08-15 PROCEDURE — 85025 COMPLETE CBC W/AUTO DIFF WBC: CPT

## 2020-08-15 PROCEDURE — 99232 SBSQ HOSP IP/OBS MODERATE 35: CPT | Performed by: SURGERY

## 2020-08-15 PROCEDURE — 99222 1ST HOSP IP/OBS MODERATE 55: CPT | Performed by: INTERNAL MEDICINE

## 2020-08-15 PROCEDURE — 85014 HEMATOCRIT: CPT

## 2020-08-15 PROCEDURE — 76770 US EXAM ABDO BACK WALL COMP: CPT

## 2020-08-15 RX ORDER — MIDODRINE HYDROCHLORIDE 5 MG/1
5 TABLET ORAL
Status: DISCONTINUED | OUTPATIENT
Start: 2020-08-15 | End: 2020-08-19

## 2020-08-15 RX ORDER — 0.9 % SODIUM CHLORIDE 0.9 %
20 INTRAVENOUS SOLUTION INTRAVENOUS ONCE
Status: DISCONTINUED | OUTPATIENT
Start: 2020-08-15 | End: 2020-08-15

## 2020-08-15 RX ADMIN — AMIODARONE HYDROCHLORIDE 400 MG: 200 TABLET ORAL at 09:11

## 2020-08-15 RX ADMIN — Medication 10 ML: at 09:12

## 2020-08-15 RX ADMIN — METOPROLOL TARTRATE 25 MG: 25 TABLET, FILM COATED ORAL at 20:54

## 2020-08-15 RX ADMIN — MIDODRINE HYDROCHLORIDE 5 MG: 5 TABLET ORAL at 12:13

## 2020-08-15 RX ADMIN — FAMOTIDINE 20 MG: 10 INJECTION INTRAVENOUS at 20:46

## 2020-08-15 RX ADMIN — MIDODRINE HYDROCHLORIDE 5 MG: 5 TABLET ORAL at 17:34

## 2020-08-15 RX ADMIN — SODIUM CHLORIDE: 9 INJECTION, SOLUTION INTRAVENOUS at 04:47

## 2020-08-15 RX ADMIN — FAMOTIDINE 20 MG: 10 INJECTION INTRAVENOUS at 09:12

## 2020-08-15 RX ADMIN — LEVOTHYROXINE SODIUM 100 MCG: 100 TABLET ORAL at 09:21

## 2020-08-15 RX ADMIN — Medication 10 ML: at 20:47

## 2020-08-15 RX ADMIN — ACETAMINOPHEN 650 MG: 325 TABLET ORAL at 20:57

## 2020-08-15 RX ADMIN — SODIUM CHLORIDE: 9 INJECTION, SOLUTION INTRAVENOUS at 20:41

## 2020-08-15 RX ADMIN — DOCUSATE SODIUM 100 MG: 100 CAPSULE, LIQUID FILLED ORAL at 09:11

## 2020-08-15 RX ADMIN — ALLOPURINOL 100 MG: 100 TABLET ORAL at 09:12

## 2020-08-15 RX ADMIN — ONDANSETRON 4 MG: 2 INJECTION INTRAMUSCULAR; INTRAVENOUS at 23:21

## 2020-08-15 RX ADMIN — ATORVASTATIN CALCIUM 20 MG: 20 TABLET, FILM COATED ORAL at 09:12

## 2020-08-15 ASSESSMENT — PAIN - FUNCTIONAL ASSESSMENT: PAIN_FUNCTIONAL_ASSESSMENT: PREVENTS OR INTERFERES SOME ACTIVE ACTIVITIES AND ADLS

## 2020-08-15 ASSESSMENT — PAIN SCALES - GENERAL
PAINLEVEL_OUTOF10: 0
PAINLEVEL_OUTOF10: 3
PAINLEVEL_OUTOF10: 0
PAINLEVEL_OUTOF10: 0

## 2020-08-15 ASSESSMENT — ENCOUNTER SYMPTOMS
SORE THROAT: 0
TROUBLE SWALLOWING: 0
NAUSEA: 0
CHEST TIGHTNESS: 0
ABDOMINAL PAIN: 1
COLOR CHANGE: 0
PHOTOPHOBIA: 0
SHORTNESS OF BREATH: 0
BACK PAIN: 0
VOMITING: 0
WHEEZING: 0

## 2020-08-15 ASSESSMENT — PAIN DESCRIPTION - PROGRESSION: CLINICAL_PROGRESSION: GRADUALLY WORSENING

## 2020-08-15 ASSESSMENT — PAIN DESCRIPTION - ORIENTATION: ORIENTATION: OTHER (COMMENT)

## 2020-08-15 ASSESSMENT — PAIN DESCRIPTION - DESCRIPTORS: DESCRIPTORS: ACHING;DISCOMFORT

## 2020-08-15 ASSESSMENT — PAIN DESCRIPTION - ONSET: ONSET: ON-GOING

## 2020-08-15 ASSESSMENT — PAIN DESCRIPTION - FREQUENCY: FREQUENCY: INTERMITTENT

## 2020-08-15 ASSESSMENT — PAIN DESCRIPTION - LOCATION: LOCATION: GENERALIZED

## 2020-08-15 ASSESSMENT — PAIN DESCRIPTION - PAIN TYPE: TYPE: ACUTE PAIN

## 2020-08-15 NOTE — PROGRESS NOTES
[facility]  Daily Progress Note    Pt Name: Rogers Martinez  Medical Record Number: [id number]  Date of Birth 2/21/1930   Today's Date: 8/15/2020    HD: # 1    CC: \"My belly kind of hurts and my legs feel like bricks\"  ASSESSMENT  1. Status post fall  2. Subcapsular renal and retroperitoneal hematoma   3. Hypotension  4. Acute blood loss anemia  5. Acute on chronic kidney injury  6. Hypocalcemia  7. Coagulopathy  Active Hospital Problems    Diagnosis Date Noted    Retroperitoneal bleeding [R58] 08/14/2020       PLAN  Patient admitted under Trauma Services to the ICU with Tele     Retroperitoneal bleeding              - Intensivist consulted for management of chronic conditions and hypotension              - Nephrology consulted for elevated BUN and Creatinine               - Serial H&H, Hgb stable at 7.3 this AM   - Transfused 1 unit PRBCs this AM   - Transfuse as necessary, keep Hgb >7.0              - Vital signs monitoring              - Given Kcentra and vitamin K              - INR 1.51 on admission. - TEG parameters within normal range     Leukocytosis, resolved              - 10.9 on admission, 9.1 this AM     Acute blood loss anemia              - Serial H&H q6, 7.3 this AM   - Transfused 1 unit PRBCs this AM              - Transfuse as necessary, keep Hgb >7.0       Consults: Intensivist, Nephrology     Pain Management              - Fentanyl, Tylenol     Prophylaxis: SCD's, Incentive Spirometry, Colace, Pepcid, Zofran     Clear liquids this AM, advance as tolerated     Gentle IVF Management  Regular Neurovascular Checks  Repeat Labs Tomorrow AM  PT/OT/SLP Eval and Treat as appropriate  Strict bedrest     Planned Discharge pending clinical course.         SUBJECTIVE  Patient seen in the ICU this morning. Patient reports mild generalized pain to his abdomen. States his legs also feel heavy \"like bricks\".  Patient requesting to eat this CREATININE 2.3* 2.5*     COAGS:   Recent Labs     08/14/20  1540 08/15/20  0632   APTT 30.4 29.6   PROT  --  4.7*   INR 1.51* 1.42*     Pancreas/HFP:  No results for input(s): LIPASE, AMYLASE in the last 72 hours. Recent Labs     08/15/20  0632   AST 31   ALT 26   BILITOT 2.0*   ALKPHOS 53     RADIOLOGY:    Narrative    PROCEDURE: CT INTERPRETATION OF OUTSIDE IMAGES         CLINICAL INFORMATION: Trauma transfer.         COMPARISON: No prior study.         TECHNIQUE:    Interpretation of an outside CT examination of the abdomen/pelvis without contrast from [facility].              FINDINGS:    LIMITATIONS:    1. The lack of intravenous contrast limits evaluation of the solid organs.         LUNG BASES:    1. There are bilateral pleural effusions, small to moderate on the left and small on the right with associated consolidation within the lower chest bilaterally. 2. There is a subcentimeter noncalcified nodule within the right lower chest.    3. There is mild cardiomegaly. 4. There is a pacemaker/AICD device. 5. There is a small amount of gas along the anterior aspect of the heart to the right of midline. There is no pneumothorax. 6. Coronary artery calcifications are present however not quantified. There is also calcification along the aortic valve.         LIVER/GALLBLADDER/BILIARY TREE: Unremarkable.         PANCREAS/SPLEEN:    1. There is fatty infiltration of the pancreas which is otherwise unremarkable. 2. The unopacified spleen is unremarkable.         ADRENAL GLANDS/KIDNEYS:    1. The bilateral adrenal glands are unremarkable. 2. The left kidney is atrophic. 3. There is enlargement of the right kidney with mixed attenuation throughout the right kidney. The high attenuation seen more peripherally suggesting a subcapsular hematoma.  There is also a large amount of hemorrhage within the right perinephric space,    right posterior perirenal space and down within the right abdomen/retroperitoneum with extension inferiorly into the right pelvis. The largest component of the hematoma is within the right abdomen/retroperitoneum tracking inferiorly into the right pelvis     measuring 14.5 x 9.6 x 7.3 cm in longitudinal, transverse and AP dimensions. There is also hemorrhage tracking medially within the abdomen and proximal pelvis. In addition, there is fluid suggesting hemorrhage adjacent to the spleen and tracking    inferiorly along the left colonic gutter. As previously noted no obvious splenic laceration is identified on the noncontrasted imaging.         BOWEL:    1.  The bowel gas pattern is nonobstructive. The ascending colon is not seen in its entirety and may be of secured resected. There are a few mildly dilated loops of small bowel and small air-fluid levels within a few loops of small bowel and also within    the transverse colon. Findings suggest an ileus.         FREE AIR/FREE FLUID/INFLAMMATION:    1. As previously described. 2. There is no free air.         LYMPHADENOPATHY:    1. No pathologically enlarged lymph nodes.         ABDOMINAL AORTA:    1. Atheromatous calcification throughout the abdominal aorta, iliac, common femoral, superficial femoral and profunda femoral arteries. 2. There is atheromatous calcification within the celiac trunk and superior mesenteric artery. 3. There is ectasia of the right common iliac artery.         ABDOMINAL WALL:    1. There are stranding densities within the subcutaneous tissues suggesting edema which may be related to 3rd spacing. 2. There is gynecomastia bilaterally.         MUSCULOSKELETAL:    1. The bony structures are osteopenic. 2. There is levoscoliosis of the lumbar spine. There is 0.7 cm anterolisthesis of L4 on L5. There is a compression fracture of the L1 vertebral body with approximately 30-40% loss of superior height which most likely is chronic. 3. Multilevel degenerative changes.     4. There are syndesmophytes which can be associated with ankylosing spondylitis.         OTHER: None.                   Impression    1. There is enlargement of the right kidney with mixed attenuation throughout the right kidney. The high attenuation seen more peripherally suggesting a subcapsular hematoma. There is also a large amount of hemorrhage within the right perinephric space,    right posterior perirenal space and down within the right abdomen/retroperitoneum with extension inferiorly into the right pelvis. The largest component of the hematoma is within the right abdomen/retroperitoneum tracking inferiorly into the right pelvis     measuring 14.5 x 9.6 x 7.3 cm in longitudinal, transverse and AP dimensions. There is also hemorrhage tracking medially within the abdomen and proximal pelvis. In addition, there is fluid suggesting hemorrhage adjacent to the spleen and tracking    inferiorly along the left colonic gutter. As previously noted no obvious splenic laceration is identified on the noncontrasted imaging.         2. There are bilateral pleural effusions, small to moderate on the left and small on the right with associated consolidation within the lower chest bilaterally.         3.  The bowel gas pattern is nonobstructive. The ascending colon is not seen in its entirety and may be of secured resected. There are a few mildly dilated loops of small bowel and small air-fluid levels within a few loops of small bowel and also within    the transverse colon. Findings suggest an ileus.         4. Numerous additional findings as described in the body of the report.         5. Correlation should be made with the report from [facility] to exclude additional findings. This report was not available at the time of interpretation.                        **This report has been created using voice recognition software.  It may contain minor errors which are inherent in voice recognition technology. **         Final report electronically signed by Dr. Osmar Walters on 8/15/2020 10:03 AM        Electronically signed by Marli Weber PA-C on 8/15/2020 at 8:32 AM     Patient seen and examined independently by me earlier this AM. Above discussed and I agree with SUNIL Cunha. See my additional comments below for updated orders and plan. Labs, cultures, and radiographs where available were reviewed. I discussed patient concerns with the patient's nurse and instructions were given. Please see our orders for the updated patient care plan. -Hemoglobin appears to be stabilizing. Repeat labs in a.m. Coagulopathy improved. Hemodynamically stable. Pain and nausea control. Advance diet. Nephrology following. SCDs for DVT prophylaxis. Bedrest at this time. Okay to transfer out of ICU from my standpoint.   Electronically signed by Mani Maxwell MD on 8/15/20 at 5:18 PM EDT

## 2020-08-15 NOTE — CONSULTS
[facility]    [address], [address]  [facility]  8/15/2020 9:43 AM    Pt Name:    Ana Maria Childress  MRN:     [id number]   [id number]  YOB: 1930  Admit Date:    8/14/2020  3:18 PM  Primary Care Physician:  Isamar Aguilar        Reason for Consult:  Decreased renal function    History:   The patient is a pleasant 80 y.o. male with history of chronic kidney disease stage IIIb, Congestive heart failure with EF of 40 to 45%, history of ventricular tachycardia status post AICD, atrial fibrillation, coronary artery disease status post CABG, prostate cancer transferred from [facility] he was found to have a retroperitoneal bleed on CAT scan. Patient had fallen about 12 days prior to the event and then was noted to have acute onset right-sided abdominal and flank pain. He did become hypotensive and his hemoglobin dropped to 7 previously was 9.7. His INR was over 3. He is on warfarin for atrial fibrillation. He was given Kcentra vitamin C 1 unit of packed red blood cells and 3 L of normal saline and transferred to [facility]. He also received 1 unit of blood last night at [facility]. Hemoglobin this morning is 7.3. He did have a kidney ultrasound at [facility] which showed perinephric hematoma on the right is 7.5 cm previously on the CAT scan it was 11 cm. Nephrology was consulted for decreased renal function. His serum creatinine on admission at [facility] was 2.9 it subsequently when rechecked was 2.3 and today is 2.5. He does have a Solis catheter in place urine output was about 200 cc overnight. He does appear to have some chronic kidney disease. He has never seen a nephrologist before. But on lab review his creatinine runs in the low 2 range. He is on Bumex 2 mg daily at home for chronic systolic heart failure. He has had a couple hospitalizations over the summer for congestive heart failure.   Currently he denies any shortness of breath. He is on room air. He states his swelling for him is good. He has had some low blood pressures throughout admission in the ICU. Past Medical History:  No past medical history on file. Past Surgical History:  No past surgical history on file. Family History:  No family history on file. Social History:  Social History     Socioeconomic History    Marital status: Single     Spouse name: Not on file    Number of children: Not on file    Years of education: Not on file    Highest education level: Not on file   Occupational History    Not on file   Social Needs    Financial resource strain: Not on file    Food insecurity     Worry: Not on file     Inability: Not on file    Transportation needs     Medical: Not on file     Non-medical: Not on file   Tobacco Use    Smoking status: Not on file   Substance and Sexual Activity    Alcohol use: Not on file    Drug use: Not on file    Sexual activity: Not on file   Lifestyle    Physical activity     Days per week: Not on file     Minutes per session: Not on file    Stress: Not on file   Relationships    Social connections     Talks on phone: Not on file     Gets together: Not on file     Attends Church service: Not on file     Active member of club or organization: Not on file     Attends meetings of clubs or organizations: Not on file     Relationship status: Not on file    Intimate partner violence     Fear of current or ex partner: Not on file     Emotionally abused: Not on file     Physically abused: Not on file     Forced sexual activity: Not on file   Other Topics Concern    Not on file   Social History Narrative    Not on file       Home Meds:  Prior to Admission medications    Medication Sig Start Date End Date Taking?  Authorizing Provider   polyethylene glycol (GLYCOLAX) 17 g packet Take 17 g by mouth daily   Yes Historical Provider, MD   allopurinol (ZYLOPRIM) 100 MG tablet Take 100 mg by mouth daily   Yes Historical Provider, MD   atorvastatin (LIPITOR) 20 MG tablet Take 20 mg by mouth daily   Yes Historical Provider, MD   metoprolol tartrate (LOPRESSOR) 25 MG tablet Take 25 mg by mouth 2 times daily Hold if SBP <100 or DBP<60   Yes Historical Provider, MD   docusate sodium (COLACE) 100 MG capsule Take 100 mg by mouth daily   Yes Historical Provider, MD   levothyroxine (SYNTHROID) 100 MCG tablet Take 100 mcg by mouth Daily   Yes Historical Provider, MD   acetaminophen (TYLENOL) 500 MG tablet Take 500 mg by mouth every 6 hours as needed for Pain   Yes Historical Provider, MD   meclizine (ANTIVERT) 12.5 MG tablet Take 12.5 mg by mouth 3 times daily as needed for Dizziness   Yes Historical Provider, MD   warfarin (COUMADIN) 5 MG tablet Take 5 mg by mouth daily   Yes Historical Provider, MD   bumetanide (BUMEX) 2 MG tablet Take 2 mg by mouth daily   Yes Historical Provider, MD   bumetanide (BUMEX) 1 MG tablet Take 1 mg by mouth Daily with lunch   Yes Historical Provider, MD   zolpidem (AMBIEN) 5 MG tablet Take 5 mg by mouth nightly. Yes Historical Provider, MD   nitroGLYCERIN (NITROSTAT) 0.4 MG SL tablet Place 0.4 mg under the tongue every 5 minutes as needed for Chest pain up to max of 3 total doses. If no relief after 1 dose, call 911.    Yes Historical Provider, MD   potassium citrate (UROCIT-K) 10 MEQ (1080 MG) extended release tablet Take 10 mEq by mouth daily   Yes Historical Provider, MD   amiodarone (CORDARONE) 200 MG tablet Take 400 mg by mouth daily   Yes Historical Provider, MD   aspirin 81 MG EC tablet Take 81 mg by mouth nightly   Yes Historical Provider, MD       Review of Systems:  Constitutional: no fever or chills +hard of hearing + dizziness  Head: No headaches  Eyes: no blurry vision, no discharge  Ears: no ear pain or hearing changes  Nose: no runny nose or epistaxis  Respiratory: no shortness of breath or cough or sputum production  Cardiovascular: no chest pain, no edema  GI: no nausea, no vomiting or diarrhea  : +flank pain  Skin: no rash  Musculoskeletal: no joint pain, moves all ext  Neuro: no tremor, no slurred speech  Psychiatric: stable mood, no depression or insomnia    Current Meds:  Infusion:    sodium chloride 75 mL/hr at 08/15/20 0447     Meds:    sodium chloride  20 mL Intravenous Once    midodrine  5 mg Oral TID WC    sodium chloride flush  10 mL Intravenous 2 times per day    famotidine (PEPCID) injection  20 mg Intravenous BID    allopurinol  100 mg Oral Daily    amiodarone  400 mg Oral Daily    atorvastatin  20 mg Oral Daily    docusate sodium  100 mg Oral Daily    levothyroxine  100 mcg Oral Daily    metoprolol tartrate  25 mg Oral BID     Meds prn: sodium chloride flush, acetaminophen, polyethylene glycol, promethazine **OR** ondansetron, fentanNYL     Allergies/Intolerances: ALLERGIES: Patient has no allergy information on record. 24HR INTAKE/OUTPUT:      Intake/Output Summary (Last 24 hours) at 8/15/2020 0943  Last data filed at 8/15/2020 0453  Gross per 24 hour   Intake 1254.47 ml   Output 575 ml   Net 679.47 ml     I/O last 3 completed shifts: In: 1254.5 [I.V.:1254.5]  Out: 575 [Urine:575]  No intake/output data recorded. Admission weight: 185 lb (83.9 kg)  Wt Readings from Last 3 Encounters:   08/15/20 217 lb 11.2 oz (98.7 kg)     Body mass index is 29.53 kg/m². Physical Examination:  VITALS:  BP 96/83   Pulse 80   Temp 98 °F (36.7 °C) (Oral)   Resp 21   Ht 6' (1.829 m)   Wt 217 lb 11.2 oz (98.7 kg)   SpO2 97%   BMI 29.53 kg/m²   Weight:   Wt Readings from Last 3 Encounters:   08/15/20 217 lb 11.2 oz (98.7 kg)     Constitutional and General Appearance: awake, alert, hard of hearing  Eyes: no icteric sclera, no pallor conjunctiva, no discharge seen from either eye  Ears and Nose: normal external appearance of left and right ear and nose. No active drainage from nose.    Oral: moist oral mucus membranes  Neck: No jugular venous distention, appears symmetric, good ROM  Lungs: Air entry B/L, no crackles or rales, no use of accessory muscles  Heart: regular rate, S1, S2  Extremities: trace LE edema noted  GI: soft, mild tenderness suprapubic  Skin:bruising to right flank noted  Musculo: moves all extremities  Neuro: no slurred speech, no facial drooping, no asterixis  Psychiatric: Awake, alert, Oriented    Lab Data  CBC:   Recent Labs     08/14/20  1540 08/14/20  1824 08/14/20  2339 08/15/20  0632   WBC 10.9*  --   --  9.1   HGB 7.3* 7.4* 7.0* 7.3*   HCT 23.7* 24.3* 22.5* 23.1*     --   --  121*     BMP:  Recent Labs     08/14/20  1540 08/15/20  0632    144   K 4.6 4.8    113*   CO2 22* 20*   BUN 49* 52*   CREATININE 2.3* 2.5*   GLUCOSE 123* 95   CALCIUM 7.5* 7.6*     PTH: @PTH@  TSH: No results for input(s): TSH in the last 72 hours. HgBa1c: No results for input(s): LABA1C in the last 72 hours. Hepatic:   Recent Labs     08/15/20  0632   LABALBU 3.2*   AST 31   ALT 26   BILITOT 2.0*   ALKPHOS 53     ABGs: No results found for: PHART, PO2ART, WNO1QFX  Troponin: No results for input(s): TROPONINI in the last 72 hours. BNP: No results for input(s): BNP in the last 72 hours. Old labs reviewed. Impression and Plan:  1. Mild CALVIN on chronic kidney disease stage IIIb this is likely due to renal hypoperfusion from severe anemia plus hypotension. He has received a lot of IV fluids and blood products so for now we will decrease his IV fluid rate to 60 mL/h. Will check CPK due to recent falls. Urinalysis at outside hospital was benign there was no proteinuria or hematuria. Does have underlying chronic kidney disease and review of previous labs and this is likely from chronic cardiorenal syndrome. 2. Hypotension due to blood loss anemia. Status post red blood cell transfusion. We will also start on low-dose Midrin  3. Acute blood loss anemia from retroperitoneal bleed  4. Retroperitoneal bleed status post fall  5.  Perinephric hematoma  6. Supratherapeutic INR  7. Atrial fibrillation  8. Hypothyroidism    Thank you for allowing me to participate in the care of this patient. Please feel free to call me if you have any questions.      Electronically signed by Milton Melton DO on 8/15/20 at 9:43 AM EDT    [facility]

## 2020-08-15 NOTE — PROGRESS NOTES
[address] ICU 4D  Bedside Swallowing Evaluation      SLP Individual Minutes  Time In: 8405  Time Out: 1148  Minutes: 20  Timed Code Treatment Minutes: 0 Minutes       Date: 8/15/2020  Patient Name: Jairon Saeed      CSN: [id number]   : 1930  (80 y.o.)  Gender: male   Referring Physician:  SUNIL Vinson  Diagnosis: Retroperitoneal bleeding   Secondary Diagnosis: Dysphagia     History of Present Illness/Injury: Patient admitted with the above diagnosis s/p fall 12 days prior. Please refer to physician H&P for further details. ST consulted to assess swallow function prior to advancement to full po diet. *Patient denies hitting head during fall. No head imaging documented with no report of neurological deficits, thus cognitive evaluation not warranted at this time. No past medical history on file. SUBJECTIVE:  TRESA Booker giving permission to see the patient and provide solid textures. Patient seen at bedside, alert and cooperative. OBJECTIVE:    Pain:  No pain reported. Current Diet: Clear liquid diet    Respiratory Status:  Independent    Behavioral Observation:  Alert and Oriented    Oral Mechanism Evaluation:      Facial / Labial WFL    Lingual WFL    Dentition WFL    Velum WFL    Vocal Quality WFL    Sensation WFL    Cough WFL      Patient Evaluated Using:  Puree, Hard solid (crackers), Thin liquids (via cup and straw)    Oral Phase:  WFL    Pharyngeal Phase: Impaired:  Slightly reduced bolus control with straws. Signs and Symptoms of Laryngeal Penetration/Aspiration: Immediate Cough x1 with thin by straw, did not occur on subsequent trials    Impresssions: Patient presents with mild oropharyngeal dysphagia evidenced by slightly reduced bolus control with thin liquids via straw. Improved coordination evident with thin liquids via cup. Appropriate mastication and bolus management evident with puree and hard solid textures.  Cough evident x1 with thin liquids via straw. No cough, throat clear or change in vocal quality evident with subsequent trials of thin liquids via straw and cup. RECOMMENDATIONS/ASSESSMENT:   Modified Barium Swallow:  MBS is not indicated at this time. Will recommend as appropriate  Diet Recommendations:  Clear liquid diet- Okay to advance to SOFT and BITE size with THIN liquids when medically cleared. Strategies:  Full Upright Position, Small Bite/Sip, Pulmonary Monitoring, Limit Distractions and Monitor for Fatigue   Rehabilitation Potential: good    EDUCATION:  Learner: Patient  Education:  Reviewed results and recommendations of this evaluation, Reviewed diet and strategies and Reviewed signs, symptoms and risks of aspiration  Evaluation of Education: Verbalizes understanding and Family not present    PLAN:  Skilled SLP intervention on acute care 3-5 x per week or until goals met and/or pt plateaus in function. Specific interventions for next session may include: Diet monitor. PATIENT GOAL:    Return to least restrictive diet. SHORT TERM GOALS:  Short-term Goals  Timeframe for Short-term Goals: 2 weeks  Goal 1: Patient will safely consume soft and bite size diet with thin liquids (ensure patient is cleared from clear liquids) without difficulty or s/s of aspiration to ensure adequate nutrition and hydration. Goal 2: Patient will complete advanced solid trials with with SLP only to determine appropriateness of diet advancement. LONG TERM GOALS:  No long term goals established due to estimated length of stay. [facility].  [address], [facility] [address], [address]

## 2020-08-15 NOTE — CONSULTS
CRITICAL CARE CONSULT NOTE      Patient:  Jose Roberto Russ    Unit/Bed:4D-10/010-A  YOB: 1930  MRN: [id number]   PCP: Zach Lima  Date of Admission: 8/14/2020  Chief Complaint:- Fall     Assessment and Plan:      1. Mechanical fall: PT/OT to assess once stable. Patient denies shortness of breath or dizziness at time of fall. Social work to evaluate for possible need for rehab. 2. Retroperitoneal hemorrhage: Patient did have noted traumatic fall. CT at [facility] reported large retroperitoneal hemorrhage. Coumadin was reversed with Kcentra and vitamin K. Monitor hemoglobin every 6 hours  3. Subcapsular renal hematoma:  Coumadin was reversed with Kcentra and vitamin K. Monitor hemoglobin every 6 hours  4. Hypotension: Not noted to be in shock. Patient is not requiring vasopressors. Patient is status post 1 unit of blood given at [facility]. Monitor for need for additional blood. 5. Acute blood loss anemia: Secondary to #2. Every 6 hours H&H. Transfuse to keep hemoglobin greater than 7.  6. Acute on chronic renal injury: Baseline unclear. Nephrology was consulted. Continue IV fluids. Avoid nephrotoxic drugs. Likely secondary to hypotensive, urine lites pending. Renal ultrasound pending. 7. Atrial fibrillation: Continue amiodarone, hold Coumadin secondary to #2. Rate controlled. 8. Hyperlipidemia: Lipitor 20 mg nightly continued  9. Hypothyroidism: Continue Synthroid 100 mcg daily  10. Hypertension: Lopressor 25 mg daily with hold parameters, patient currently hypotensive. INITIAL H AND P AND ICU COURSE:  Jose Roberto Russ is a 72-year-old white male who presented to [facility] on 8/14/2020 as a level 1 trauma transfer from [facility].  He has a past medical history of CAD, atrial fib, anticoagulation use. Per report EMS initially took him to [facility] where a retroperitoneal bleed was identified on CT scan. He did have an INR of over 3.0.   Per docusate sodium  100 mg Oral Daily    levothyroxine  100 mcg Oral Daily    metoprolol tartrate  25 mg Oral BID     Continuous Infusions:   sodium chloride 75 mL/hr at 08/14/20 2214       PHYSICAL EXAMINATION:  T:  97.8. P:  80. RR:  18. B/P:  98/48. FiO2:  0. O2 Sat:  97.  I/O:  220/375  Body mass index is 28.79 kg/m². GCS:   15  General: Acute on chronically ill-appearing white male  HEENT:  normocephalic and atraumatic. No scleral icterus. PERR  Neck: supple. No Thyromegaly. Lungs: clear to auscultation. No retractions  Cardiac: Paced . No JVD. Abdomen: soft. Nontender. Extensive bruising to left side of chest  Extremities:  No clubbing, cyanosis, or edema x 4. Vasculature: capillary refill < 3 seconds. Palpable dorsalis pedis pulses. Skin:  warm and dry. Psych:  Alert and oriented x3. Affect appropriate  Lymph:  No supraclavicular adenopathy. Neurologic:  No focal deficit. No seizures. Data: (All radiographs, tracings, PFTs, and imaging are personally viewed and interpreted unless otherwise noted).  Sodium 140, potassium 4.6, chloride 108, CO2 22, BUN 49, creatinine 2.3, anion gap 12.0, lactic acid 2.2, glucose 123   WBC 10.9, hemoglobin 7.3, hematocrit 23.7, platelet count 141.  Telemetry shows Paced   INR 1.51   TEG normal      Meets Continued ICU Level Care Criteria:    [x] Yes   [] No - Transfer Planned to listed location:  [] HOSPITALIST CONTACTED-      Case and plan discussed with  [facility]. Electronically signed by Dwayne Quach. MENDEL Ríos - CNP  CRITICAL CARE SPECIALIST  Patient seen by me. Case discussed with nurse practitioner. Undergoing volume expansion. Status post Coumadin reversal.  Monitoring retroperitoneal hemorrhage and renal hematoma. Transfuse as necessary. Electronically signed by Edi Uriarte  [facility] Π[facility]Adrian[facility] MD.

## 2020-08-15 NOTE — PROGRESS NOTES
CRITICAL CARE PROGRESS NOTE      Patient:  Zak Nguyen    Unit/Bed:4D-10/010-A  YOB: 1930  MRN: [id number]   Acct: [de-identified]   PCP: Eddy Robertson  Date of Admission: 8/14/2020  Chief Complaint:-Fall    Assessment and Plan:    1. Mechanical Fall: PT/OT when stable. 2. Retroperitoneal Hemorrhage: S/P mechanical fall 12 days prior to admission. Coumadin was reversed with Kcentra and vitamin K. Hemoglobin stabilizing. Continue to trend H&H every six hours. 3. Hypotension: (Not shock)  Secondary to #2. Transfuse PRBCs for hemoglobin less than 7 or hemodynamic instability. Started on low-dose midodrine per Nephrology. 4. Subscapsular Renal Hematoma: Plan as per #2.  5. Acute Blood Loss Anemia: S/P 1 unit PRBCs at [facility] &  Transfused 1 PRBC on 8/15/2020. Continue to trend H&H. Transfuse for hemoglobin 7 or below or hemodynamic instability. 6. Thrombocytopenia: Secondary to consumptive process. Trend platelet count. 7. Acute Kidney Injury on Chronic Kidney Disease stage III: Related to hypotension and anemia. CPK pending. IV fluids decreased. Nephrology consulted. Trend BMP. 8. Atrial Fibrillation: (Rate controlled) continue to hold Coumadin secondary to #2. Continue amiodarone. Beta-blocker held secondary to hypotension. 9. Chronic HFrEF:  Echocardiogram EF 40 to 45%. Beta-blocker and Bumex held secondary to hypotension. 10. History Ventricular Tachycardia s/p PPP/AICD: Continue telemetry. 11. Hyperlipidemia: Continue statin. 12. Acquired Hypothyroidism: Continue Synthroid. 13. Essential Hypertension: Currently borderline hypotensive. Hold beta-blocker. INITIAL H AND P AND ICU COURSE:    (Per chart review)  Zak Nguyen is a 43-year-old white male who presented to [facility] on 8/14/2020 as a level 1 trauma transfer [facility].  He has a past medical history of CAD, atrial fib, anticoagulation use.   Per report EMS [facility] took [facility] where a retroperitoneal bleed was identified on CT scan. He did have an INR of over 3.0. Per report he had fallen 12 days prior to this event and was noted to have right-sided abdominal pain, this morning it was increased and he did go to the emergency department where he did become hypotensive. CT scan was performed and did identify retroperitoneal bleed. He was given Kcentra, vitamin C, 1 unit packed red blood cells, and 3 L normal saline prior to arrival at [facility]. On arrival per report he was continuing to keep complaining of right lower abdominal pain that was worse with palpation. He was admitted to the ICU for trauma services and consult to critical care. Subjective:- (Last 24 hours)    Patient denies chest pain, nausea/vomiting, abdominal pain, presyncope or dizziness. Past medical history, family history, social history and allergies reviewed again and is unchanged since admission. ROS (12 point review of systems completed. Pertinent positives noted. Otherwise ROS is negative)      Scheduled Meds:   sodium chloride  20 mL Intravenous Once    midodrine  5 mg Oral TID WC    sodium chloride flush  10 mL Intravenous 2 times per day    famotidine (PEPCID) injection  20 mg Intravenous BID    allopurinol  100 mg Oral Daily    amiodarone  400 mg Oral Daily    atorvastatin  20 mg Oral Daily    docusate sodium  100 mg Oral Daily    levothyroxine  100 mcg Oral Daily    metoprolol tartrate  25 mg Oral BID     Continuous Infusions:   sodium chloride 75 mL/hr at 08/15/20 0447     PRN Meds:.sodium chloride flush, acetaminophen, polyethylene glycol, promethazine **OR** ondansetron, fentanNYL     PHYSICAL EXAMINATION:  Vital signs: Temperature 98, respirations 21, pulse 80, blood pressure 96/83, pulse ox 97% on room air.   Intake: 1254 mL's/Output: 575 mL's  Wt Readings from Last 3 Encounters:   08/15/20 217 lb 11.2 oz (98.7 kg)      Body mass index is 29.53 patient in detail. The above assessment and plan has been reviewed. Please see my modifications mentioned below. My modifications:  He is on room air. Not in any distress. Hb/HCT- stable.      rDew Castro MD 8/15/2020 2:30 PM

## 2020-08-15 NOTE — PLAN OF CARE
.  Intensivist Progress Note      Patient:  [facility]    Unit/Bed:4D-10/010-A  YOB: 1930  MRN: [id number]   Acct: [de-identified]     PCP: Blanca Rivera  Date of Admission: 8/14/2020    Signout per phone to [employer]. Patient stable tot transfer to .     Darya Partida, APRN - CNP

## 2020-08-16 ENCOUNTER — APPOINTMENT (OUTPATIENT)
Dept: GENERAL RADIOLOGY | Age: 85
DRG: 964 | End: 2020-08-16
Payer: MEDICARE

## 2020-08-16 LAB
ANION GAP SERPL CALCULATED.3IONS-SCNC: 12 MEQ/L (ref 8–16)
ANION GAP SERPL CALCULATED.3IONS-SCNC: 14 MEQ/L (ref 8–16)
BUN BLDV-MCNC: 54 MG/DL (ref 7–22)
BUN BLDV-MCNC: 55 MG/DL (ref 7–22)
CALCIUM SERPL-MCNC: 8.2 MG/DL (ref 8.5–10.5)
CALCIUM SERPL-MCNC: 8.2 MG/DL (ref 8.5–10.5)
CHLORIDE BLD-SCNC: 104 MEQ/L (ref 98–111)
CHLORIDE BLD-SCNC: 109 MEQ/L (ref 98–111)
CO2: 18 MEQ/L (ref 23–33)
CO2: 19 MEQ/L (ref 23–33)
CREAT SERPL-MCNC: 2.8 MG/DL (ref 0.4–1.2)
CREAT SERPL-MCNC: 2.9 MG/DL (ref 0.4–1.2)
GFR SERPL CREATININE-BSD FRML MDRD: 21 ML/MIN/1.73M2
GFR SERPL CREATININE-BSD FRML MDRD: 21 ML/MIN/1.73M2
GLUCOSE BLD-MCNC: 102 MG/DL (ref 70–108)
GLUCOSE BLD-MCNC: 119 MG/DL (ref 70–108)
HCT VFR BLD CALC: 21.9 % (ref 42–52)
HCT VFR BLD CALC: 22.6 % (ref 42–52)
HCT VFR BLD CALC: 23.1 % (ref 42–52)
HCT VFR BLD CALC: 23.8 % (ref 42–52)
HEMOGLOBIN: 7 GM/DL (ref 14–18)
HEMOGLOBIN: 7.1 GM/DL (ref 14–18)
HEMOGLOBIN: 7.2 GM/DL (ref 14–18)
HEMOGLOBIN: 7.3 GM/DL (ref 14–18)
MRSA SCREEN: NORMAL
POTASSIUM SERPL-SCNC: 4.3 MEQ/L (ref 3.5–5.2)
POTASSIUM SERPL-SCNC: 4.5 MEQ/L (ref 3.5–5.2)
SODIUM BLD-SCNC: 136 MEQ/L (ref 135–145)
SODIUM BLD-SCNC: 140 MEQ/L (ref 135–145)
T4 FREE: 1.79 NG/DL (ref 0.93–1.76)
TOTAL CK: 30 U/L (ref 55–170)
TSH SERPL DL<=0.05 MIU/L-ACNC: 5.33 UIU/ML (ref 0.4–4.2)
URINE CULTURE, ROUTINE: NORMAL

## 2020-08-16 PROCEDURE — 84443 ASSAY THYROID STIM HORMONE: CPT

## 2020-08-16 PROCEDURE — 94640 AIRWAY INHALATION TREATMENT: CPT

## 2020-08-16 PROCEDURE — 99233 SBSQ HOSP IP/OBS HIGH 50: CPT | Performed by: HOSPITALIST

## 2020-08-16 PROCEDURE — 2500000003 HC RX 250 WO HCPCS: Performed by: PHYSICIAN ASSISTANT

## 2020-08-16 PROCEDURE — 6360000002 HC RX W HCPCS: Performed by: HOSPITALIST

## 2020-08-16 PROCEDURE — 6370000000 HC RX 637 (ALT 250 FOR IP): Performed by: INTERNAL MEDICINE

## 2020-08-16 PROCEDURE — 2580000003 HC RX 258: Performed by: PHYSICIAN ASSISTANT

## 2020-08-16 PROCEDURE — 85018 HEMOGLOBIN: CPT

## 2020-08-16 PROCEDURE — 80048 BASIC METABOLIC PNL TOTAL CA: CPT

## 2020-08-16 PROCEDURE — 94760 N-INVAS EAR/PLS OXIMETRY 1: CPT

## 2020-08-16 PROCEDURE — 6370000000 HC RX 637 (ALT 250 FOR IP): Performed by: NURSE PRACTITIONER

## 2020-08-16 PROCEDURE — 6370000000 HC RX 637 (ALT 250 FOR IP): Performed by: HOSPITALIST

## 2020-08-16 PROCEDURE — 36415 COLL VENOUS BLD VENIPUNCTURE: CPT

## 2020-08-16 PROCEDURE — 99232 SBSQ HOSP IP/OBS MODERATE 35: CPT | Performed by: SURGERY

## 2020-08-16 PROCEDURE — 2060000000 HC ICU INTERMEDIATE R&B

## 2020-08-16 PROCEDURE — 99232 SBSQ HOSP IP/OBS MODERATE 35: CPT | Performed by: INTERNAL MEDICINE

## 2020-08-16 PROCEDURE — 71045 X-RAY EXAM CHEST 1 VIEW: CPT

## 2020-08-16 PROCEDURE — 82550 ASSAY OF CK (CPK): CPT

## 2020-08-16 PROCEDURE — 84439 ASSAY OF FREE THYROXINE: CPT

## 2020-08-16 PROCEDURE — 85014 HEMATOCRIT: CPT

## 2020-08-16 PROCEDURE — APPSS60 APP SPLIT SHARED TIME 46-60 MINUTES: Performed by: PHYSICIAN ASSISTANT

## 2020-08-16 RX ORDER — ALBUTEROL SULFATE 2.5 MG/3ML
5 SOLUTION RESPIRATORY (INHALATION) EVERY 4 HOURS PRN
Status: DISCONTINUED | OUTPATIENT
Start: 2020-08-16 | End: 2020-08-21 | Stop reason: HOSPADM

## 2020-08-16 RX ORDER — ZOLPIDEM TARTRATE 5 MG/1
5 TABLET ORAL NIGHTLY PRN
Status: DISCONTINUED | OUTPATIENT
Start: 2020-08-16 | End: 2020-08-21 | Stop reason: HOSPADM

## 2020-08-16 RX ORDER — FUROSEMIDE 10 MG/ML
40 INJECTION INTRAMUSCULAR; INTRAVENOUS ONCE
Status: COMPLETED | OUTPATIENT
Start: 2020-08-16 | End: 2020-08-16

## 2020-08-16 RX ADMIN — METOPROLOL TARTRATE 25 MG: 25 TABLET, FILM COATED ORAL at 20:50

## 2020-08-16 RX ADMIN — ALBUTEROL SULFATE 5 MG: 2.5 SOLUTION RESPIRATORY (INHALATION) at 13:12

## 2020-08-16 RX ADMIN — ALLOPURINOL 100 MG: 100 TABLET ORAL at 07:37

## 2020-08-16 RX ADMIN — MIDODRINE HYDROCHLORIDE 5 MG: 5 TABLET ORAL at 11:10

## 2020-08-16 RX ADMIN — FAMOTIDINE 20 MG: 10 INJECTION INTRAVENOUS at 07:36

## 2020-08-16 RX ADMIN — METOPROLOL TARTRATE 25 MG: 25 TABLET, FILM COATED ORAL at 07:36

## 2020-08-16 RX ADMIN — FAMOTIDINE 20 MG: 10 INJECTION INTRAVENOUS at 20:52

## 2020-08-16 RX ADMIN — DOCUSATE SODIUM 100 MG: 100 CAPSULE, LIQUID FILLED ORAL at 07:36

## 2020-08-16 RX ADMIN — MIDODRINE HYDROCHLORIDE 5 MG: 5 TABLET ORAL at 07:37

## 2020-08-16 RX ADMIN — ZOLPIDEM TARTRATE 5 MG: 5 TABLET ORAL at 20:56

## 2020-08-16 RX ADMIN — AMIODARONE HYDROCHLORIDE 400 MG: 200 TABLET ORAL at 07:36

## 2020-08-16 RX ADMIN — ATORVASTATIN CALCIUM 20 MG: 20 TABLET, FILM COATED ORAL at 07:37

## 2020-08-16 RX ADMIN — FUROSEMIDE 40 MG: 10 INJECTION, SOLUTION INTRAMUSCULAR; INTRAVENOUS at 13:20

## 2020-08-16 RX ADMIN — LEVOTHYROXINE SODIUM 100 MCG: 100 TABLET ORAL at 05:12

## 2020-08-16 RX ADMIN — Medication 10 ML: at 20:50

## 2020-08-16 SDOH — HEALTH STABILITY: MENTAL HEALTH: HOW OFTEN DO YOU HAVE A DRINK CONTAINING ALCOHOL?: NEVER

## 2020-08-16 ASSESSMENT — PAIN SCALES - GENERAL
PAINLEVEL_OUTOF10: 0

## 2020-08-16 NOTE — PROGRESS NOTES
Hospitalist Progress Note      Patient:  Mana Arambula    Unit/Bed:-23/023-A  YOB: 1930  MRN: [id number]   Acct: [de-identified]   PCP: Milton Maldonado  Date of Admission: 8/14/2020    Assessment/Plan:    1. Worsening (mild) CALVIN on chronic kidney disease stage IV: likely multifactorial; initially pre-renal d/t hemorrhage and hypotension. Pt s/o IVF and transfusion w/ no recovery; Hx of HFrEF per TTE from June 2020. Possible cardiorenal now? Solis in place 600 cc/24 hrs. FeNa from yesterday c/w pre-renal etiology vs cardiorenal w/ decreased ECV? Off IVF now. Will give lasix 40 mg IV once and reassess response. nephro also following  2. Hypotension due to blood loss anemia. stable on midodrine 5 TID, CCM  3. Acute blood loss anemia from retroperitoneal bleed/Perinephric hematoma: Hb stable at 7. 2. will trend closely. HD stable; pt denies symptoms. ASA/VKA held      4. Hx of chronic Atrial fibrillation w/ Supratherapeutic INR POA: CVR on BB/amiodarone; off [address] s/p reversal w/ INR 1.42. given age and hx of fall, consider keeping permanently off [address]   5. Hx of hypothyroidism: on levothyroxine, TSH sent  6. Hx of HFrEF w/ query acute decompensation 2/2 volume overload: on BB, managing as discussed above  7. Hx of CAD: on statin/BB. Off ASA  8. Code status: DNR-CCA, palliative to see  9. Obersity w/ BMI 30.2: Counseling offered   10. PT/OT to see    Chief Complaint: fall/polytrauma    Initial H and P:-    Admitted under ICU/trauma team on 8/14/2020 for hypotension s/p fall w/ retroperitoneal bleed. Noted nephro also following. Pt was transferred to   overnight. Subjective (past 24 hours):   Mild SOB; Denies any CP/cough/palpitation/fever/chills      Past medical history, family history, social history and allergies reviewed again and is unchanged since admission. ROS (12 point review of systems completed. Pertinent positives noted.  Otherwise ROS is 08/15/20  1814 08/16/20  0036 08/16/20  0548   WBC 10.9*  --  9.1  --   --   --   --    HGB 7.3*   < > 7.3*   < > 7.3* 7.0* 7.1*   HCT 23.7*   < > 23.1*   < > 22.7* 21.9* 22.6*     --  121*  --   --   --   --     < > = values in this interval not displayed. Recent Labs     08/14/20  1540 08/15/20  0632 08/16/20  0548    144 140   K 4.6 4.8  4.8 4.5    113* 109   CO2 22* 20* 19*   BUN 49* 52* 54*   CREATININE 2.3* 2.5* 2.8*   CALCIUM 7.5* 7.6* 8.2*     Recent Labs     08/15/20  0632   AST 31   ALT 26   BILITOT 2.0*   ALKPHOS 53     Recent Labs     08/14/20  1540 08/15/20  0632   INR 1.51* 1.42*     Recent Labs     08/16/20  0548   CKTOTAL 30*       Microbiology:    Blood culture #1: No results found for: [facility]    Blood culture #2:No results found for: Zuly Hernandez    Organism:No results found for: ORG    No results found for: LABGRAM    MRSA culture only:No results found for: [address] [place] [address]    Urine culture:   Lab Results   Component Value Date    LABURIN No growth-preliminary No growth  08/14/2020       Respiratory culture: No results found for: CULTRESP    Aerobic and Anaerobic :  No results found for: LABAERO  No results found for: LABANAE    Urinalysis:    No results found for: [place], BACTERIA, RBCUA, BLOODU, SPECGRAV, GLUCOSEU    Radiology:  US RENAL COMPLETE   Final Result      1. Heterogeneous right perinephric collection correlate with perinephric hematoma. 2. No obstructive uropathy present. 3. Right pleural effusion present. 4. Left upper quadrant free fluid present. **This report has been created using voice recognition software. It may contain minor errors which are inherent in voice recognition technology. **      Final report electronically signed by Dr. Caryl Flores on 8/15/2020 6:33 AM      CT INTERPRETATION OF OUTSIDE IMAGES   Final Result   1. There is enlargement of the right kidney with mixed attenuation throughout the right kidney.  The high attenuation seen more peripherally suggesting a subcapsular hematoma. There is also a large amount of hemorrhage within the right perinephric space,    right posterior perirenal space and down within the right abdomen/retroperitoneum with extension inferiorly into the right pelvis. The largest component of the hematoma is within the right abdomen/retroperitoneum tracking inferiorly into the right pelvis    measuring 14.5 x 9.6 x 7.3 cm in longitudinal, transverse and AP dimensions. There is also hemorrhage tracking medially within the abdomen and proximal pelvis. In addition, there is fluid suggesting hemorrhage adjacent to the spleen and tracking    inferiorly along the left colonic gutter. As previously noted no obvious splenic laceration is identified on the noncontrasted imaging. 2. There are bilateral pleural effusions, small to moderate on the left and small on the right with associated consolidation within the lower chest bilaterally. 3.  The bowel gas pattern is nonobstructive. The ascending colon is not seen in its entirety and may be of secured resected. There are a few mildly dilated loops of small bowel and small air-fluid levels within a few loops of small bowel and also within    the transverse colon. Findings suggest an ileus. 4. Numerous additional findings as described in the body of the report. 5. Correlation should be made with the report from [facility] to exclude additional findings. This report was not available at the time of interpretation. **This report has been created using voice recognition software. It may contain minor errors which are inherent in voice recognition technology. **      Final report electronically signed by Dr. Pal Pimentel on 8/15/2020 10:03 AM      XR COMPARISON OF OUTSIDE FILMS   Final Result        Us Renal Complete    Result Date: 8/15/2020  PROCEDURE: US RENAL COMPLETE CLINICAL INFORMATION: fatoumata on ckd. COMPARISON: No prior study.  TECHNIQUE: Grayscale and color images were obtained of both kidneys. FINDINGS: Limited exam detail secondary to suboptimal acoustic window. RIGHT KIDNEY - 8.7 x 5.3 x 4.8 cm Resistive Index - 0.67 Cortical Thickness - 1.1 cm. There is a perinephric heterogeneous fluid collection that measures 7.1 x 7.5 x 2.4 cm with slightly heterogeneous appearance. This suggests the perinephric hematoma previously described per history. There is no visualized obstructive uropathy. LEFT KIDNEY - 9.4 x 5.5 x 4.6 cm Resistive Index - 0.64 Cortical Thickness - 1.2 cm There is normal echogenicity of the renal parenchyma bilaterally. There is no thinning of the renal cortex. There is no hydronephrosis. No stones are noted. The urinary bladder is decompressed by a Solis catheter the detail is limited. There is noted right pleural effusion partially visualized. There is free fluid in the left upper quadrant, correlate with ascites versus hematoma. 1. Heterogeneous right perinephric collection correlate with perinephric hematoma. 2. No obstructive uropathy present. 3. Right pleural effusion present. 4. Left upper quadrant free fluid present. **This report has been created using voice recognition software. It may contain minor errors which are inherent in voice recognition technology. ** Final report electronically signed by Dr. Caryl Flores on 8/15/2020 6:33 AM    Ct Interpretation Of Outside Images    Result Date: 8/15/2020  PROCEDURE: CT INTERPRETATION OF OUTSIDE IMAGES CLINICAL INFORMATION: Trauma transfer. COMPARISON: No prior study. TECHNIQUE: Interpretation of an outside CT examination of the abdomen/pelvis without contrast from [facility]. FINDINGS: LIMITATIONS: 1. The lack of intravenous contrast limits evaluation of the solid organs. LUNG BASES: 1. There are bilateral pleural effusions, small to moderate on the left and small on the right with associated consolidation within the lower chest bilaterally.  2. There is a subcentimeter noncalcified nodule within the right lower chest. 3. There is mild cardiomegaly. 4. There is a pacemaker/AICD device. 5. There is a small amount of gas along the anterior aspect of the heart to the right of midline. There is no pneumothorax. 6. Coronary artery calcifications are present however not quantified. There is also calcification along the aortic valve. LIVER/GALLBLADDER/BILIARY TREE: Unremarkable. PANCREAS/SPLEEN: 1. There is fatty infiltration of the pancreas which is otherwise unremarkable. 2. The unopacified spleen is unremarkable. ADRENAL GLANDS/KIDNEYS: 1. The bilateral adrenal glands are unremarkable. 2. The left kidney is atrophic. 3. There is enlargement of the right kidney with mixed attenuation throughout the right kidney. The high attenuation seen more peripherally suggesting a subcapsular hematoma. There is also a large amount of hemorrhage within the right perinephric space, right posterior perirenal space and down within the right abdomen/retroperitoneum with extension inferiorly into the right pelvis. The largest component of the hematoma is within the right abdomen/retroperitoneum tracking inferiorly into the right pelvis  measuring 14.5 x 9.6 x 7.3 cm in longitudinal, transverse and AP dimensions. There is also hemorrhage tracking medially within the abdomen and proximal pelvis. In addition, there is fluid suggesting hemorrhage adjacent to the spleen and tracking inferiorly along the left colonic gutter. As previously noted no obvious splenic laceration is identified on the noncontrasted imaging. BOWEL: 1. The bowel gas pattern is nonobstructive. The ascending colon is not seen in its entirety and may be of secured resected. There are a few mildly dilated loops of small bowel and small air-fluid levels within a few loops of small bowel and also within the transverse colon. Findings suggest an ileus. FREE AIR/FREE FLUID/INFLAMMATION: 1. As previously described. 2. There is no free air. associated consolidation within the lower chest bilaterally. 3.  The bowel gas pattern is nonobstructive. The ascending colon is not seen in its entirety and may be of secured resected. There are a few mildly dilated loops of small bowel and small air-fluid levels within a few loops of small bowel and also within the transverse colon. Findings suggest an ileus. 4. Numerous additional findings as described in the body of the report. 5. Correlation should be made with the report from [facility] to exclude additional findings. This report was not available at the time of interpretation. **This report has been created using voice recognition software. It may contain minor errors which are inherent in voice recognition technology. ** Final report electronically signed by Dr. Travis Guevara on 8/15/2020 10:03 AM    Xr Comparison Of Outside Films    Result Date: 8/15/2020  Radiology exam is complete. No Radiologist dictation. Please follow up with ordering provider. With RN in room, patient was updated about and agreed upon the treatment plan, all the questions and concerns were addressed.       Electronically signed by Geni Jin MD on 8/16/2020 at 8:49 AM

## 2020-08-16 NOTE — PROGRESS NOTES
[address] THERAPY MISSED TREATMENT NOTE  [facility] ICU STEPDOWN TELEMETRY 4K  4K-23/023-A      Date: 2020  Patient Name: Ana Maria Childress        CSN: [id number]   : 1930  (80 y.o.)  Gender: male                REASON FOR MISSED TREATMENT:  Hold per nurse. Pt is on strict bedrest d/t watching bleeding & hemoglobin is borderline. Will check back at a later date.

## 2020-08-16 NOTE — FLOWSHEET NOTE
08/16/20 0123   Provider Notification   Reason for Communication Critical Value (comment)  (Hgb 7.0)   Provider Name Anne Marie BARBER   Provider Notification Advance Practice Clinician (CNS, NP, CNM, CRNA, PA)   Method of Communication Secure Message   Response Waiting for response   Notification Time [phone]- This RN sent a secure message to Anne Marie BARBER to update that the patient was admitted on 8/14 with a retroperitoneal bleed. Pt's Hgb just came back at 7.0 Hct 21.9. No signs of bleeding noted tonight. What would you like to do? This RN also updated that his next H&H lab is at 0600am. Waiting on response. 2216Cokym BARBER asked this RN what the patient's previous hemoglobin was. 0126- This RN sent another secure message to Anne Marie BARBER to update that the patient's hemoglobin trend since 8/14 has been 7.0- 7.3- 7.7- 7.3 & 7.0 now. Waiting on response. 0127Cokym BARBER stated to continue to monitor and to let her know when the 6:00 draw comes back and that they would transfuse if comes back less than 7.

## 2020-08-16 NOTE — PLAN OF CARE
Problem: Pain Control  Goal: Maintain pain level at or below patient's acceptable level (or 5 if patient is unable to determine acceptable level)  Outcome: Ongoing  Note: Pain Assessment: 0-10  Pain Level: 0   Patient's Stated Pain Goal: No pain   Is pain goal met at this time? Yes     Non-Pharmaceutical Pain Intervention(s): Rest, Repositioned     Problem: Neurological  Goal: Maximum potential motor/sensory/cognitive function  Outcome: Ongoing  Note: Patient alert and oriented, but can be forgetful at times. Problem: Cardiovascular  Goal: No DVT, peripheral vascular complications  Outcome: Ongoing  Note: No signs or symptoms of DVT. Patient wearing his bilateral lower leg SCD's. Goal: Hemodynamic stability  Outcome: Ongoing  Note: Vital signs have remained stable and within normal limits. Vitals being monitored every 4 hours and as needed. Continuous cardiac monitor in place. Heart rhythm is ventricular tachycardia. Patient has a permeant pacemaker & defibrillator. Problem: Respiratory  Goal: No pulmonary complications  Outcome: Ongoing  Note: Patient on room air with oxygen saturations above 92%. No SOB noted at rest. Lungs are clear in the upper lobes and diminished in the bases. Goal: O2 Sat > 90%  Outcome: Ongoing  Note: Patient on room air with oxygen saturations above 92%. Problem: GI  Goal: No bowel complications  Outcome: Ongoing  Note: No BM tonight. Abdomen is soft, rounded and non-tender. Active bowel sounds heard in all 4 quadrants. Patient is passing gas. Problem: Nutrition  Goal: Optimal nutrition therapy  Outcome: Ongoing  Note: Patient on a clear liquid diet. Problem: Skin Integrity/Risk  Goal: No skin breakdown during hospitalization  Outcome: Ongoing  Note: No new areas of skin breakdown noted tonight. Skin is warm, dry and swollen. Patient has multiple skin integrity issues (see doc flowsheets for list).  Patient being turned and repositioned every 2 hours with pillows. Bilateral arms and heels elevated on pillows. EPC Cream applied to buttocks. Problem: Musculor/Skeletal Functional Status  Goal: Absence of falls  Outcome: Ongoing  Note: Patient has remaiend free from falls tonight. Call light and bedside able are within reach. Patient alert and oriented and uses his call light appropriately. Patient on strict bedrest. Fall precautions in place for his safety. Nurse checks on the patient every hour. Problem: Discharge Planning:  Goal: Discharged to appropriate level of care  Description: Discharged to appropriate level of care  Outcome: Ongoing  Note: Patient from the [address] [place] [address] and plans to return when medically stable. Problem: Urinary Elimination:  Goal: Signs and symptoms of infection will decrease  Description: Signs and symptoms of infection will decrease  Outcome: Ongoing  Note: Patient has a alejandro catheter for fluid management. Alejandro is patent and draining clear, yellow urine. Problem: Bleeding:  Goal: Will show no signs and symptoms of excessive bleeding  Description: Will show no signs and symptoms of excessive bleeding  Outcome: Ongoing  Note: No signs of bleeding. Care plan reviewed with patient. Patient verbalize understanding of the plan of care and contribute to goal setting.

## 2020-08-16 NOTE — PROGRESS NOTES
Renal Progress Note  [facility]    Patient :  Tyree Mann; 80 y.o. MRN# [id number]  Location:  [facility]98/302-E  Attending:  Richard Mix MD  Admit Date:  8/14/2020   Hospital Day: 2      Subjective:     Nephrology is following the patient for CALVIN/CKD. Patient seen and examined. Looks a little short of breath. CXR ordered. On room air. UO recorded 600 cc/24 hours. Outpatient Medications:     Medications Prior to Admission: polyethylene glycol (GLYCOLAX) 17 g packet, Take 17 g by mouth daily  allopurinol (ZYLOPRIM) 100 MG tablet, Take 100 mg by mouth daily  atorvastatin (LIPITOR) 20 MG tablet, Take 20 mg by mouth daily  metoprolol tartrate (LOPRESSOR) 25 MG tablet, Take 25 mg by mouth 2 times daily Hold if SBP <100 or DBP<60  docusate sodium (COLACE) 100 MG capsule, Take 100 mg by mouth daily  levothyroxine (SYNTHROID) 100 MCG tablet, Take 100 mcg by mouth Daily  acetaminophen (TYLENOL) 500 MG tablet, Take 500 mg by mouth every 6 hours as needed for Pain  meclizine (ANTIVERT) 12.5 MG tablet, Take 12.5 mg by mouth 3 times daily as needed for Dizziness  warfarin (COUMADIN) 5 MG tablet, Take 5 mg by mouth daily  bumetanide (BUMEX) 2 MG tablet, Take 2 mg by mouth daily (with breakfast)   bumetanide (BUMEX) 1 MG tablet, Take 1 mg by mouth Daily with lunch  zolpidem (AMBIEN) 5 MG tablet, Take 5 mg by mouth nightly. potassium citrate (UROCIT-K) 10 MEQ (1080 MG) extended release tablet, Take 10 mEq by mouth daily  amiodarone (CORDARONE) 200 MG tablet, Take 400 mg by mouth daily  aspirin 81 MG EC tablet, Take 81 mg by mouth nightly  nitroGLYCERIN (NITROSTAT) 0.4 MG SL tablet, Place 0.4 mg under the tongue every 5 minutes as needed for Chest pain up to max of 3 total doses. If no relief after 1 dose, call 911.     Current Medications:     Scheduled Meds:    midodrine  5 mg Oral TID WC    sodium chloride flush  10 mL Intravenous 2 times per day    famotidine (PEPCID) injection  20 mg 108 113* 109   CO2 22* 20* 19*   BUN 49* 52* 54*   CREATININE 2.3* 2.5* 2.8*   GLUCOSE 123* 95 102   CALCIUM 7.5* 7.6* 8.2*      Phosphorus:   No results for input(s): PHOS in the last 72 hours. Magnesium:  No results for input(s): MG in the last 72 hours. Albumin:    Recent Labs     08/15/20  0632   LABALBU 3.2*     BNP:    No results found for: BNP  OLIVIA:    No results found for: OLIVIA  SPEP:  Lab Results   Component Value Date    PROT 4.7 08/15/2020     UPEP:   No results found for: LABPE  C3:   No results found for: C3  C4:   No results found for: C4  MPO ANCA:   No results found for: MPO  PR3 ANCA:   No results found for: PR3  Anti-GBM:   No results found for: GBMABIGG  Hep BsAg:       No results found for: HEPBSAG  Hep C AB:        No results found for: HEPCAB    Urinalysis/Chemistries:    No results found for: NITRU, COLORU, PHUR, LABCAST, WBCUA, RBCUA, MUCUS, TRICHOMONAS, YEAST, BACTERIA, CLARITYU, SPECGRAV, LEUKOCYTESUR, UROBILINOGEN, BILIRUBINUR, BLOODU, GLUCOSEU, KETUA, AMORPHOUS  Urine Sodium:   No results found for: PALLAVI  Urine Potassium:  No results found for: KUR  Urine Chloride:  No results found for: CLUR  Urine Osmolarity: No results found for: OSMOU  Urine Protein:   No components found for: TOTALPROTEIN, URINE   Urine Creatinine:   No results found for: LABCREA  Urine Eosinophils:  No components found for: UEOS        Impression and Plan:  1. 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 Baseline creatinine low 2s. Renal function worsening, on IV fluids but does appear to be more short of breath today so agree with discontinuing IV fluids and dose of Lasix 40 mg IVP x 2.  2. Mild metabolic acidosis: should improve with diuretic  3. Hypotension from blood loss. On Midodrine  4. Acute blood loss anemia from retroperitoneal bleed  5. Subcapsular renal hematoma and retroperitoneal hematoma  6. Coagulopathy  7. S/p fall  8. Afib  9. CAD/CABG  10.  Systolic CHF with EF 40-45%        Please don't hesitate to call with any questions.   Electronically signed by Rashida Combs DO on 8/16/2020 at 11:13 AM

## 2020-08-16 NOTE — PROGRESS NOTES
[facility]  Daily Progress Note    Pt Name: Rogers Martinez  Medical Record Number: [id number]  Date of Birth 2/21/1930   Today's Date: 8/16/2020    HD: # 2    CC: \"Lets see, not too bad\"  ASSESSMENT  1. Status post fall  2. Subcapsular renal and retroperitoneal hematoma   3. Hypotension  4. Acute blood loss anemia  5. Acute on chronic kidney injury  6. Hypocalcemia  7. Coagulopathy  Active Hospital Problems    Diagnosis Date Noted    Retroperitoneal bleeding [R58] 08/14/2020       PLAN  Patient admitted under Trauma Services to the ICU with Tele     Retroperitoneal bleeding              - Intensivist consulted for management of chronic conditions and hypotension              - Nephrology consulted for elevated BUN and Creatinine               - Serial H&H, Hgb stable at 7.2 this AM   - Transfused 1 unit PRBCs 8/16   - Transfuse as necessary, keep Hgb >7.0              - Vital signs monitoring              - Given Kcentra and vitamin K              - INR 1.51 on admission. - TEG parameters within normal range     Leukocytosis, resolved              - 10.9 on admission, 9.1 this AM     Acute blood loss anemia              - Serial H&H q6, 7.2 this AM   - Transfused 1 unit PRBCs this AM              - Transfuse as necessary, keep Hgb >7.0       Consults: Intensivist, Nephrology     Pain Management              - Fentanyl, Tylenol     Prophylaxis: SCD's, Incentive Spirometry, Colace, Pepcid, Zofran     Clear liquids this AM, advance as tolerated     Gentle IVF Management  Regular Neurovascular Checks  Repeat Labs Tomorrow AM  PT/OT/SLP Eval and Treat as appropriate  Strict bedrest     Planned Discharge pending clinical course.         SUBJECTIVE  Patient continues no 4K. Complains of persistent mild right flank and right lower abdominal pain, feels his legs are heavy. He had an episode of nausea last night relieved with medication.  Complains of intermittent SOB, denies prior history of pulmonary disease. Patient denies chest pain, cough, headache, dizziness, lightheadedness, numbness, paraesthesias, weakness, vomiting, neck pain, or back pain. Nursing staff states stable, hospitalist to restart home meds. Plan to monitor for signs of continued blood loss. Hemoglobin stable at 7.2, BUN and creatinine mildly worsened, CT images reviewed, vital signs stable on exam.       Wt Readings from Last 3 Encounters:   08/16/20 223 lb 4.8 oz (101.3 kg)     Temp Readings from Last 3 Encounters:   08/16/20 97.6 °F (36.4 °C) (Oral)     BP Readings from Last 3 Encounters:   08/16/20 113/65     Pulse Readings from Last 3 Encounters:   08/16/20 75       24 HR INTAKE/OUTPUT :     Intake/Output Summary (Last 24 hours) at 8/16/2020 1251  Last data filed at 8/16/2020 0342  Gross per 24 hour   Intake 2544.43 ml   Output 600 ml   Net 1944.43 ml     DIET GENERAL;    OBJECTIVE  CURRENT VITALS /65   Pulse 75   Temp 97.6 °F (36.4 °C) (Oral)   Resp 18   Ht 6' (1.829 m)   Wt 223 lb 4.8 oz (101.3 kg)   SpO2 97%   BMI 30.28 kg/m²      GENERAL: Presents sitting upright in bed unassisted, Awake and alert; In no acute distress and well nourished. SKIN: Appropriate for ethnicity, warm and dry. EYES: No apparent trauma, discharge, or hematoma bilaterally. PERRL at 3mm  CARDIO: No visible chest wall deformity. No heaves or lifts. Strong/regular S1/S2 rate and rhythm. No rubs murmurs, or gallops. 2+ radial and dorsalis pedal pulses. Capillary refill <2 sec. No extremity edema noted. PULMONARY:  Trachea midline, mild wheezing, increased respiratory effort. Lung sounds are clear to ascultation in superior and inferior fields. No wheezing, crackles, or rhonchi. ABDOMEN: Abdomen is non distended. Bowel sounds present in all four quadrants. Soft. Tender to palpation of right flank, no tenderness with abdominal palpation. Ecchymosis to left flank. NEURO: Follows commands.  PMS Coronary artery calcifications are present however not quantified. There is also calcification along the aortic valve.         LIVER/GALLBLADDER/BILIARY TREE: Unremarkable.         PANCREAS/SPLEEN:    1. There is fatty infiltration of the pancreas which is otherwise unremarkable. 2. The unopacified spleen is unremarkable.         ADRENAL GLANDS/KIDNEYS:    1. The bilateral adrenal glands are unremarkable. 2. The left kidney is atrophic. 3. There is enlargement of the right kidney with mixed attenuation throughout the right kidney. The high attenuation seen more peripherally suggesting a subcapsular hematoma. There is also a large amount of hemorrhage within the right perinephric space,    right posterior perirenal space and down within the right abdomen/retroperitoneum with extension inferiorly into the right pelvis. The largest component of the hematoma is within the right abdomen/retroperitoneum tracking inferiorly into the right pelvis     measuring 14.5 x 9.6 x 7.3 cm in longitudinal, transverse and AP dimensions. There is also hemorrhage tracking medially within the abdomen and proximal pelvis. In addition, there is fluid suggesting hemorrhage adjacent to the spleen and tracking    inferiorly along the left colonic gutter. As previously noted no obvious splenic laceration is identified on the noncontrasted imaging.         BOWEL:    1.  The bowel gas pattern is nonobstructive. The ascending colon is not seen in its entirety and may be of secured resected. There are a few mildly dilated loops of small bowel and small air-fluid levels within a few loops of small bowel and also within    the transverse colon. Findings suggest an ileus.         FREE AIR/FREE FLUID/INFLAMMATION:    1. As previously described. 2. There is no free air.         LYMPHADENOPATHY:    1. No pathologically enlarged lymph nodes.         ABDOMINAL AORTA:    1.  Atheromatous calcification throughout the abdominal aorta, iliac, common femoral, superficial femoral and profunda femoral arteries. 2. There is atheromatous calcification within the celiac trunk and superior mesenteric artery. 3. There is ectasia of the right common iliac artery.         ABDOMINAL WALL:    1. There are stranding densities within the subcutaneous tissues suggesting edema which may be related to 3rd spacing. 2. There is gynecomastia bilaterally.         MUSCULOSKELETAL:    1. The bony structures are osteopenic. 2. There is levoscoliosis of the lumbar spine. There is 0.7 cm anterolisthesis of L4 on L5. There is a compression fracture of the L1 vertebral body with approximately 30-40% loss of superior height which most likely is chronic. 3. Multilevel degenerative changes. 4. There are syndesmophytes which can be associated with ankylosing spondylitis.         OTHER: None.                   Impression    1. There is enlargement of the right kidney with mixed attenuation throughout the right kidney. The high attenuation seen more peripherally suggesting a subcapsular hematoma. There is also a large amount of hemorrhage within the right perinephric space,    right posterior perirenal space and down within the right abdomen/retroperitoneum with extension inferiorly into the right pelvis. The largest component of the hematoma is within the right abdomen/retroperitoneum tracking inferiorly into the right pelvis     measuring 14.5 x 9.6 x 7.3 cm in longitudinal, transverse and AP dimensions. There is also hemorrhage tracking medially within the abdomen and proximal pelvis. In addition, there is fluid suggesting hemorrhage adjacent to the spleen and tracking    inferiorly along the left colonic gutter. As previously noted no obvious splenic laceration is identified on the noncontrasted imaging.         2.  There are bilateral pleural effusions, small to moderate on the left and small on the right with associated consolidation within the lower chest bilaterally.         3.  The bowel gas pattern is nonobstructive. The ascending colon is not seen in its entirety and may be of secured resected. There are a few mildly dilated loops of small bowel and small air-fluid levels within a few loops of small bowel and also within    the transverse colon. Findings suggest an ileus.         4. Numerous additional findings as described in the body of the report.         5. Correlation should be made with the report from [facility] to exclude additional findings. This report was not available at the time of interpretation.                        **This report has been created using voice recognition software.  It may contain minor errors which are inherent in voice recognition technology. **         Final report electronically signed by Dr. Lise Morrison on 8/15/2020 10:03 AM      CXR  Impression    1. The lung volumes are slightly diminished. There is slightly worsened opacity at the right lung base which most commonly represents a small right pleural effusion and atelectasis/infiltrate. The lateral aspect of the left lower chest is partially    disturbed by the overlying generator device. Given this limitation, there is slightly worsened opacity within the left lower chest with the differential including pleural fluid and atelectasis/infiltrates. There is no pulmonary vascular congestion. Follow-up chest radiographs recommended to confirm complete resolution.              **This report has been created using voice recognition software.  It may contain minor errors which are inherent in voice recognition technology. **         Final report electronically signed by Dr. Lise Morrison on 8/16/2020 11:47 AM          Electronically signed by SUNIL Hernández on 8/16/2020 at 12:51 PM     Patient seen and examined independently by me earlier this AM. Above discussed and I agree with SUNIL Hernández. See my additional comments below for updated orders and plan.  Labs, cultures, and radiographs where available were reviewed. I discussed patient concerns with the patient's nurse and instructions were given. Please see our orders for the updated patient care plan. -Hemoglobin appears to be stabilizing. No signs of active bleeding clinically. Limited activity. Neurovascular checks. Pain and nausea control. SCDs for DVT prophylaxis. Advance diet as tolerated but monitor for persistent nausea or emesis. Okay for home medicines from my standpoint other than blood thinners. Chest x-ray reviewed and awaiting recommendations from hospitalist.  Vital signs stable. Therapy when appropriate. A.m. labs. Neurovascular checks.     Electronically signed by Camila Foss MD on 8/16/20 at 12:55 PM EDT

## 2020-08-17 ENCOUNTER — APPOINTMENT (OUTPATIENT)
Dept: GENERAL RADIOLOGY | Age: 85
DRG: 964 | End: 2020-08-17
Payer: MEDICARE

## 2020-08-17 LAB
ABO: NORMAL
ANION GAP SERPL CALCULATED.3IONS-SCNC: 12 MEQ/L (ref 8–16)
ANION GAP SERPL CALCULATED.3IONS-SCNC: 12 MEQ/L (ref 8–16)
ANTIBODY SCREEN: NORMAL
BUN BLDV-MCNC: 58 MG/DL (ref 7–22)
BUN BLDV-MCNC: 59 MG/DL (ref 7–22)
CALCIUM SERPL-MCNC: 8.3 MG/DL (ref 8.5–10.5)
CALCIUM SERPL-MCNC: 8.5 MG/DL (ref 8.5–10.5)
CHLORIDE BLD-SCNC: 106 MEQ/L (ref 98–111)
CHLORIDE BLD-SCNC: 107 MEQ/L (ref 98–111)
CHLORIDE, URINE: < 20 MEQ/L
CO2: 20 MEQ/L (ref 23–33)
CO2: 20 MEQ/L (ref 23–33)
CREAT SERPL-MCNC: 3.1 MG/DL (ref 0.4–1.2)
CREAT SERPL-MCNC: 3.2 MG/DL (ref 0.4–1.2)
GFR SERPL CREATININE-BSD FRML MDRD: 18 ML/MIN/1.73M2
GFR SERPL CREATININE-BSD FRML MDRD: 19 ML/MIN/1.73M2
GLUCOSE BLD-MCNC: 95 MG/DL (ref 70–108)
GLUCOSE BLD-MCNC: 97 MG/DL (ref 70–108)
HCT VFR BLD CALC: 21.9 % (ref 42–52)
HCT VFR BLD CALC: 22.3 % (ref 42–52)
HCT VFR BLD CALC: 24.5 % (ref 42–52)
HCT VFR BLD CALC: 25.1 % (ref 42–52)
HEMOGLOBIN: 6.8 GM/DL (ref 14–18)
HEMOGLOBIN: 6.9 GM/DL (ref 14–18)
HEMOGLOBIN: 7.3 GM/DL (ref 14–18)
HEMOGLOBIN: 8.1 GM/DL (ref 14–18)
POTASSIUM SERPL-SCNC: 4.6 MEQ/L (ref 3.5–5.2)
POTASSIUM SERPL-SCNC: 4.6 MEQ/L (ref 3.5–5.2)
POTASSIUM, URINE: 52.3 MEQ/L
RH FACTOR: NORMAL
SODIUM BLD-SCNC: 138 MEQ/L (ref 135–145)
SODIUM BLD-SCNC: 139 MEQ/L (ref 135–145)
SODIUM URINE: < 20 MEQ/L

## 2020-08-17 PROCEDURE — 85014 HEMATOCRIT: CPT

## 2020-08-17 PROCEDURE — 2580000003 HC RX 258: Performed by: HOSPITALIST

## 2020-08-17 PROCEDURE — 94760 N-INVAS EAR/PLS OXIMETRY 1: CPT

## 2020-08-17 PROCEDURE — 80048 BASIC METABOLIC PNL TOTAL CA: CPT

## 2020-08-17 PROCEDURE — 6370000000 HC RX 637 (ALT 250 FOR IP): Performed by: NURSE PRACTITIONER

## 2020-08-17 PROCEDURE — 84300 ASSAY OF URINE SODIUM: CPT

## 2020-08-17 PROCEDURE — 82436 ASSAY OF URINE CHLORIDE: CPT

## 2020-08-17 PROCEDURE — 6370000000 HC RX 637 (ALT 250 FOR IP): Performed by: INTERNAL MEDICINE

## 2020-08-17 PROCEDURE — 36430 TRANSFUSION BLD/BLD COMPNT: CPT

## 2020-08-17 PROCEDURE — 99232 SBSQ HOSP IP/OBS MODERATE 35: CPT | Performed by: INTERNAL MEDICINE

## 2020-08-17 PROCEDURE — 86901 BLOOD TYPING SEROLOGIC RH(D): CPT

## 2020-08-17 PROCEDURE — 99233 SBSQ HOSP IP/OBS HIGH 50: CPT | Performed by: HOSPITALIST

## 2020-08-17 PROCEDURE — APPSS60 APP SPLIT SHARED TIME 46-60 MINUTES: Performed by: PHYSICIAN ASSISTANT

## 2020-08-17 PROCEDURE — 86923 COMPATIBILITY TEST ELECTRIC: CPT

## 2020-08-17 PROCEDURE — 86850 RBC ANTIBODY SCREEN: CPT

## 2020-08-17 PROCEDURE — 99232 SBSQ HOSP IP/OBS MODERATE 35: CPT | Performed by: SURGERY

## 2020-08-17 PROCEDURE — 36415 COLL VENOUS BLD VENIPUNCTURE: CPT

## 2020-08-17 PROCEDURE — 84133 ASSAY OF URINE POTASSIUM: CPT

## 2020-08-17 PROCEDURE — 71045 X-RAY EXAM CHEST 1 VIEW: CPT

## 2020-08-17 PROCEDURE — 86900 BLOOD TYPING SEROLOGIC ABO: CPT

## 2020-08-17 PROCEDURE — 85018 HEMOGLOBIN: CPT

## 2020-08-17 PROCEDURE — P9016 RBC LEUKOCYTES REDUCED: HCPCS

## 2020-08-17 PROCEDURE — 2500000003 HC RX 250 WO HCPCS: Performed by: PHYSICIAN ASSISTANT

## 2020-08-17 PROCEDURE — 2580000003 HC RX 258: Performed by: PHYSICIAN ASSISTANT

## 2020-08-17 PROCEDURE — 2060000000 HC ICU INTERMEDIATE R&B

## 2020-08-17 PROCEDURE — 6370000000 HC RX 637 (ALT 250 FOR IP): Performed by: PHYSICIAN ASSISTANT

## 2020-08-17 RX ORDER — 0.9 % SODIUM CHLORIDE 0.9 %
20 INTRAVENOUS SOLUTION INTRAVENOUS ONCE
Status: COMPLETED | OUTPATIENT
Start: 2020-08-17 | End: 2020-08-17

## 2020-08-17 RX ORDER — SODIUM CHLORIDE 9 MG/ML
INJECTION, SOLUTION INTRAVENOUS CONTINUOUS
Status: DISCONTINUED | OUTPATIENT
Start: 2020-08-18 | End: 2020-08-19

## 2020-08-17 RX ORDER — 0.9 % SODIUM CHLORIDE 0.9 %
20 INTRAVENOUS SOLUTION INTRAVENOUS ONCE
Status: DISCONTINUED | OUTPATIENT
Start: 2020-08-17 | End: 2020-08-21 | Stop reason: HOSPADM

## 2020-08-17 RX ADMIN — METOPROLOL TARTRATE 25 MG: 25 TABLET, FILM COATED ORAL at 20:46

## 2020-08-17 RX ADMIN — POLYETHYLENE GLYCOL 3350 17 G: 17 POWDER, FOR SOLUTION ORAL at 18:52

## 2020-08-17 RX ADMIN — DOCUSATE SODIUM 100 MG: 100 CAPSULE, LIQUID FILLED ORAL at 09:30

## 2020-08-17 RX ADMIN — METOPROLOL TARTRATE 25 MG: 25 TABLET, FILM COATED ORAL at 09:29

## 2020-08-17 RX ADMIN — AMIODARONE HYDROCHLORIDE 400 MG: 200 TABLET ORAL at 09:29

## 2020-08-17 RX ADMIN — Medication 10 ML: at 20:46

## 2020-08-17 RX ADMIN — ATORVASTATIN CALCIUM 20 MG: 20 TABLET, FILM COATED ORAL at 09:29

## 2020-08-17 RX ADMIN — LEVOTHYROXINE SODIUM 100 MCG: 100 TABLET ORAL at 05:38

## 2020-08-17 RX ADMIN — ALLOPURINOL 100 MG: 100 TABLET ORAL at 09:29

## 2020-08-17 RX ADMIN — MIDODRINE HYDROCHLORIDE 5 MG: 5 TABLET ORAL at 17:49

## 2020-08-17 RX ADMIN — Medication 10 ML: at 09:29

## 2020-08-17 RX ADMIN — SODIUM CHLORIDE 20 ML: 9 INJECTION, SOLUTION INTRAVENOUS at 12:35

## 2020-08-17 RX ADMIN — MIDODRINE HYDROCHLORIDE 5 MG: 5 TABLET ORAL at 09:31

## 2020-08-17 RX ADMIN — FAMOTIDINE 20 MG: 10 INJECTION INTRAVENOUS at 09:30

## 2020-08-17 RX ADMIN — MIDODRINE HYDROCHLORIDE 5 MG: 5 TABLET ORAL at 12:34

## 2020-08-17 ASSESSMENT — PAIN SCALES - GENERAL
PAINLEVEL_OUTOF10: 0
PAINLEVEL_OUTOF10: 0

## 2020-08-17 NOTE — PROGRESS NOTES
[facility]  Daily Progress Note    Pt Name: Shashi De Anda  Medical Record Number: [id number]  Date of Birth 2/21/1930   Today's Date: 8/17/2020    HD: # 3    CC: None offered  ASSESSMENT  1. Status post fall  2. Subcapsular renal and retroperitoneal hematoma   3. Hypotension  4. Acute blood loss anemia  5. Acute on chronic kidney injury  6. Hypocalcemia  7. Coagulopathy  Active Hospital Problems    Diagnosis Date Noted    Acute blood loss anemia [D62]     Retroperitoneal bleeding [R58] 08/14/2020       PLAN  Patient admitted under Trauma Services to the ICU with Tele   - Patient now on 4K     Retroperitoneal bleeding              - Intensivist consulted for management of chronic conditions and hypotension   - Intensivist signed off care to the Hospitalist              - Nephrology consulted for worsening kidney function              - Serial H&H, Hgb 6.8 this AM   - Transfused 1 unit PRBCs 8/16, 1 unit PRBCs ordered today 8/17   - Transfuse as necessary, keep Hgb >7.0              - Vital signs monitoring              - Given Kcentra and vitamin K              - INR 1.51 on admission              - TEG parameters within normal range on admission     Leukocytosis, resolved              - 10.9 on admission, 9.1 8/15   - Likely reactive   - Afebrile this admission     Acute blood loss anemia              - Serial H&H, Hgb 6.8 this AM   - Transfused 1 unit PRBCs 8/16, 1 unit PRBCs ordered today 8/17   - Transfuse as necessary, keep Hgb >7.0       Consults: Intensivist, Hospitalist, Nephrology     Pain Management              - Fentanyl, Tylenol     Prophylaxis: SCD's, Incentive Spirometry, Colace, Pepcid, Zofran     General diet     Gentle IVF Management  Regular Neurovascular Checks  Repeat Labs Tomorrow AM  PT/OT/SLP Eval and Treat as appropriate  Strict bedrest     Planned Discharge pending clinical course.       SUBJECTIVE  Patient seen on 4K this morning. Patient is difficult to arouse this morning. Patient awakens for exam and will answer questions but falls back to sleep quickly. Per nursing staff, patient was given Ambien last night for insomnia. Patient has been very drowsy since. Patient denies any pain this morning. Denies headache, dizziness, chest pain, shortness of breath, abdominal pain, nausea, vomiting. Patient's Hgb 6.8 this AM. 1 unit PRBCs ordered by the Hospitalist. Per nursing staff patient was confused regarding the necessity of this transfusion and refused it, stating he'd like to see what his Hgb is on the next recheck. This was discussed with the patient and he will be receiving 1 unit this afternoon. Hospitalist and Nephrology on board. Case discussed with Trauma surgeon Dr. Norton Dear. Wt Readings from Last 3 Encounters:   08/16/20 223 lb 4.8 oz (101.3 kg)     Temp Readings from Last 3 Encounters:   08/17/20 98 °F (36.7 °C) (Oral)     BP Readings from Last 3 Encounters:   08/17/20 119/66     Pulse Readings from Last 3 Encounters:   08/17/20 80       24 HR INTAKE/OUTPUT :     Intake/Output Summary (Last 24 hours) at 8/17/2020 1031  Last data filed at 8/17/2020 0327  Gross per 24 hour   Intake 1120 ml   Output 800 ml   Net 320 ml     DIET GENERAL;    OBJECTIVE  CURRENT VITALS /66   Pulse 80   Temp 98 °F (36.7 °C) (Oral)   Resp 20   Ht 6' (1.829 m)   Wt 223 lb 4.8 oz (101.3 kg)   SpO2 95%   BMI 30.28 kg/m²    GENERAL: Drowsy but arousable. Confused but redirectable. NAD. Lying in hospital bed. NEURO: Follows commands. ZABALA spontaneously. CARDIO: Regular rate and rhythm with no murmurs. Peripheral pulses intact. Ecchymosis of left side of chest wall with tenderness to palpation. PULMONARY: Lungs clear to auscultation with normal work of breathing. ABDOMEN: Soft, nondistended. Normoactive bowel sounds. Abdomen is nontender to palpation.   EXTREMITIES: No cyanosis and no clubbing.     LABS  CBC :   Recent Labs     08/14/20  1540 08/15/20  0632  08/16/20  1700 08/17/20  0049 08/17/20  0549   WBC 10.9*  --  9.1  --   --   --   --    HGB 7.3*   < > 7.3*   < > 7.3* 7.3* 6.9*   HCT 23.7*   < > 23.1*   < > 23.8* 24.5* 21.9*   .8*  --  103.6*  --   --   --   --      --  121*  --   --   --   --     < > = values in this interval not displayed. BMP:   Recent Labs     08/16/20  0548 08/16/20  1700 08/17/20  0549    136 139   K 4.5 4.3 4.6    104 107   CO2 19* 18* 20*   BUN 54* 55* 58*   CREATININE 2.8* 2.9* 3.1*     COAGS:   Recent Labs     08/14/20  1540 08/15/20  0632   APTT 30.4 29.6   PROT  --  4.7*   INR 1.51* 1.42*     Pancreas/HFP:  No results for input(s): LIPASE, AMYLASE in the last 72 hours. Recent Labs     08/15/20  0632   AST 31   ALT 26   BILITOT 2.0*   ALKPHOS 53     RADIOLOGY:    Narrative    PROCEDURE: XR CHEST PORTABLE         CLINICAL INFORMATION: progression of prior read.         COMPARISON: August 16, 2020         TECHNIQUE: AP upright view of the chest.         FINDINGS:    Postoperative sternotomy changes and cardiomegaly are stable. Permanent pacemaker is again noted. There is increasing appearance of interstitial edema compatible with heart failure. Bibasilar atelectasis and pleural effusions are again noted. There is no     acute change of the skeleton.              Impression         Stable postoperative sternotomy and cardiac enlargement with visualized pacemaker.         Increasing interstitial edema correlate with slight progression of heart failure. Bibasilar atelectasis and effusions persist.    **This report has been created using voice recognition software. It may contain minor errors which are inherent in voice recognition technology. **         Final report electronically signed by Dr. Shar Hart on 8/17/2020 3:16 AM        Electronically signed by Katherine Castro PA-C on 8/17/2020 at 10:31 AM     Patient seen and examined independently by me early this AM. Above discussed and I

## 2020-08-17 NOTE — PROGRESS NOTES
Renal Progress Note  [facility]    Patient :  Brendan Joseph; 80 y.o. MRN# [id number]  Location:  5T-28/138-H  Attending:  Ivonne Khalil MD  Admit Date:  8/14/2020   Hospital Day: 3      Subjective:     Nephrology is following the patient for CALVIN on CKD. Patient was seen and examined this morning. On room air and appears comfortable. Hgb dropped and blood transfusion ordered. Bps are stable. Outpatient Medications:     Medications Prior to Admission: polyethylene glycol (GLYCOLAX) 17 g packet, Take 17 g by mouth daily  allopurinol (ZYLOPRIM) 100 MG tablet, Take 100 mg by mouth daily  atorvastatin (LIPITOR) 20 MG tablet, Take 20 mg by mouth daily  metoprolol tartrate (LOPRESSOR) 25 MG tablet, Take 25 mg by mouth 2 times daily Hold if SBP <100 or DBP<60  docusate sodium (COLACE) 100 MG capsule, Take 100 mg by mouth daily  levothyroxine (SYNTHROID) 100 MCG tablet, Take 100 mcg by mouth Daily  acetaminophen (TYLENOL) 500 MG tablet, Take 500 mg by mouth every 6 hours as needed for Pain  meclizine (ANTIVERT) 12.5 MG tablet, Take 12.5 mg by mouth 3 times daily as needed for Dizziness  warfarin (COUMADIN) 5 MG tablet, Take 5 mg by mouth daily  bumetanide (BUMEX) 2 MG tablet, Take 2 mg by mouth daily (with breakfast)   bumetanide (BUMEX) 1 MG tablet, Take 1 mg by mouth Daily with lunch  zolpidem (AMBIEN) 5 MG tablet, Take 5 mg by mouth nightly. potassium citrate (UROCIT-K) 10 MEQ (1080 MG) extended release tablet, Take 10 mEq by mouth daily  amiodarone (CORDARONE) 200 MG tablet, Take 400 mg by mouth daily  aspirin 81 MG EC tablet, Take 81 mg by mouth nightly  nitroGLYCERIN (NITROSTAT) 0.4 MG SL tablet, Place 0.4 mg under the tongue every 5 minutes as needed for Chest pain up to max of 3 total doses. If no relief after 1 dose, call 911.     Current Medications:     Scheduled Meds:    sodium chloride  20 mL Intravenous Once    midodrine  5 mg Oral TID WC    sodium chloride flush  10 mL Intravenous 2 times per day    famotidine (PEPCID) injection  20 mg Intravenous BID    allopurinol  100 mg Oral Daily    amiodarone  400 mg Oral Daily    atorvastatin  20 mg Oral Daily    docusate sodium  100 mg Oral Daily    levothyroxine  100 mcg Oral Daily    metoprolol tartrate  25 mg Oral BID     Continuous Infusions:   PRN Meds:  zolpidem, albuterol, sodium chloride flush, acetaminophen, polyethylene glycol, promethazine **OR** ondansetron, fentanNYL    Input/Output:       I/O last 3 completed shifts: In: 36 [P.O.:1210; I.V.:420]  Out: 800 [Urine:800]. Patient Vitals for the past 96 hrs (Last 3 readings):   Weight   20 0342 223 lb 4.8 oz (101.3 kg)   08/15/20 0448 217 lb 11.2 oz (98.7 kg)   20 1713 212 lb 4.9 oz (96.3 kg)       Vital Signs:   Temperature:  Temp: 97.8 °F (36.6 °C)  TMax:   Temp (24hrs), Av.9 °F (36.6 °C), Min:97.6 °F (36.4 °C), Max:98.2 °F (36.8 °C)    Respirations:  Resp: 22  Pulse:   Pulse: 80  BP:    BP: 131/74  BP Range: Systolic (85EHL), ZGE:590 , Min:112 , EIN:964       Diastolic (44XCK), GWQ:06, Min:63, Max:74      Physical Examination:     General:  Asleep, appears comfortable  HEENT: NC/AT/ MMM  Chest:               diminished  Cardiac:  irregular  Abdomen: Soft, non-tender,  Neuro:  No facial droop, No Asterixis  SKIN:  No rashes, good skin turgor. Extremities:  trace edema, no cyanosis    Labs:       Recent Labs     20  1540  08/15/20  0632  20  1700 20  0049 20  0549   WBC 10.9*  --  9.1  --   --   --   --    RBC 2.22*  --  2.23*  --   --   --   --    HGB 7.3*   < > 7.3*   < > 7.3* 7.3* 6.9*   HCT 23.7*   < > 23.1*   < > 23.8* 24.5* 21.9*   .8*  --  103.6*  --   --   --   --    MCH 32.9  --  32.7  --   --   --   --    MCHC 30.8*  --  31.6*  --   --   --   --      --  121*  --   --   --   --    MPV 12.2  --  12.6*  --   --   --   --     < > = values in this interval not displayed.       BMP:   Recent Labs 08/16/20  0548 08/16/20  1700 08/17/20  0549    136 139   K 4.5 4.3 4.6    104 107   CO2 19* 18* 20*   BUN 54* 55* 58*   CREATININE 2.8* 2.9* 3.1*   GLUCOSE 102 119* 95   CALCIUM 8.2* 8.2* 8.3*      Phosphorus:   No results for input(s): PHOS in the last 72 hours. Magnesium:  No results for input(s): MG in the last 72 hours. Albumin:    Recent Labs     08/15/20  0632   LABALBU 3.2*     BNP:    No results found for: BNP  OLIVIA:    No results found for: OLIVIA  SPEP:  Lab Results   Component Value Date    PROT 4.7 08/15/2020     UPEP:   No results found for: LABPE  C3:   No results found for: C3  C4:   No results found for: C4  MPO ANCA:   No results found for: MPO  PR3 ANCA:   No results found for: PR3  Anti-GBM:   No results found for: GBMABIGG  Hep BsAg:       No results found for: HEPBSAG  Hep C AB:        No results found for: HEPCAB    Urinalysis/Chemistries:    No results found for: NITRU, COLORU, PHUR, LABCAST, WBCUA, RBCUA, MUCUS, TRICHOMONAS, YEAST, BACTERIA, CLARITYU, SPECGRAV, LEUKOCYTESUR, UROBILINOGEN, BILIRUBINUR, BLOODU, GLUCOSEU, KETUA, AMORPHOUS  Urine Sodium:   No results found for: PALLAVI  Urine Potassium:  No results found for: KUR  Urine Chloride:  No results found for: CLUR  Urine Osmolarity: No results found for: OSMOU  Urine Protein:   No components found for: TOTALPROTEIN, URINE   Urine Creatinine:   No results found for: LABCREA  Urine Eosinophils:  No components found for: UEOS        Impression and Plan:  1. CALVIN on CKD :worse today likely from diuretic yesterday. Hold diuretics today, he is on room air and appears comfortable. Will maintain off IV fluids since he is receiving blood products  2. Hypotension on Midodrine  3. Acute blood loss anemia: receiving another 1 unit PRBC today  4. Mild metabolic acidosis: monitor  5. Subcapsular renal hematoma and retroperitoneal hematoma  6. Systolic CHF, EF 82-06%  7. Afib  8. CAD/CABG  9.  S/p fall        Please don't hesitate to call with any questions.   Electronically signed by Jay Garcia DO on 8/17/2020 at 9:09 AM

## 2020-08-17 NOTE — PROGRESS NOTES
[address] [place] [address]  PHYSICAL THERAPY MISSED TREATMENT NOTE  ACUTE CARE  [facility] ICU STEPDOWN TELEMETRY 4K              Missed Treatment  Pt remains on strict bedrest this AM. Last Hgb was 6.9. Will hold at this time and re-attempt evaluation as time allows and pt is appropriate.

## 2020-08-17 NOTE — PROGRESS NOTES
Hospitalist Progress Note      Patient:  Saira Ledbetter    Unit/Bed:4K-23/023-A  YOB: 1930  MRN: [id number]   Acct: [de-identified]   PCP: Laney Roa  Date of Admission: 8/14/2020    Assessment/Plan:    1. Worsening (mild) CALVIN on chronic kidney disease stage IV: likely multifactorial; initially pre-renal d/t hemorrhage and hypotension. Pt s/o IVF and transfusion w/ no recovery; Hx of HFrEF per TTE from June 2020. Possible cardiorenal now? Solis in place 600 cc/24 hrs. FeNa from yesterday c/w pre-renal etiology vs cardiorenal w/ decreased ECV? Off IVF now. Will give lasix 40 mg IV once and reassess response. nephro also following  8/17: Cr 3.1 from 2.9 yesterday; urine lytes pending; U/O 800 cc /last 24 hr. Will hold off on lasix for now. On R/A w/o wheezing. BP well-controlled. Will trend   2. Hypotension due to blood loss anemia. stable on midodrine 5 TID, CCM  3. Acute blood loss anemia from retroperitoneal bleed/Perinephric hematoma: Hb stable at 7. 2. will trend closely. HD stable; pt denies symptoms. ASA/VKA held  8/17: repeat Hb 6.9 today. No clinically evident bleed. Asymptomatic. Will give PRBC x1U and recheck Hb post-Tx       4. Hx of chronic Atrial fibrillation w/ Supratherapeutic INR POA: CVR on BB/amiodarone; off [address] s/p reversal w/ INR 1.42. given age and hx of fall, consider keeping permanently off [employer]   5. Hx of hypothyroidism: on levothyroxine, TSH sent  6. Hx of HFrEF w/ query acute decompensation 2/2 volume overload: on BB, managing as discussed above  7. Hx of CAD: on statin/BB. Off ASA  8. Code status: DNR-CCA, palliative to see  9. Obersity w/ BMI 30.2: Counseling offered   10. PT/OT to follow    Chief Complaint: fall/polytrauma    Initial H and P:-    Admitted under ICU/trauma team on 8/14/2020 for hypotension s/p fall w/ retroperitoneal bleed. Noted nephro also following. Pt was transferred to   overnight.     Subjective (past 24 hours):    SOB resolved; Denies any CP/cough/palpitation/fever/chills      Past medical history, family history, social history and allergies reviewed again and is unchanged since admission. ROS (12 point review of systems completed. Pertinent positives noted. Otherwise ROS is negative)     Medications:  Reviewed    Infusion Medications     Scheduled Medications    sodium chloride  20 mL Intravenous Once    midodrine  5 mg Oral TID WC    sodium chloride flush  10 mL Intravenous 2 times per day    famotidine (PEPCID) injection  20 mg Intravenous BID    allopurinol  100 mg Oral Daily    amiodarone  400 mg Oral Daily    atorvastatin  20 mg Oral Daily    docusate sodium  100 mg Oral Daily    levothyroxine  100 mcg Oral Daily    metoprolol tartrate  25 mg Oral BID     PRN Meds: zolpidem, albuterol, sodium chloride flush, acetaminophen, polyethylene glycol, promethazine **OR** ondansetron, fentanNYL      Intake/Output Summary (Last 24 hours) at 8/17/2020 0742  Last data filed at 8/17/2020 0327  Gross per 24 hour   Intake 1630 ml   Output 800 ml   Net 830 ml       Diet:  DIET GENERAL;    Exam:  /74   Pulse 80   Temp 97.8 °F (36.6 °C) (Oral)   Resp 22   Ht 6' (1.829 m)   Wt 223 lb 4.8 oz (101.3 kg)   SpO2 93%   BMI 30.28 kg/m²   General appearance: Obese, No apparent distress, appears stated age and cooperative. HEENT: Pupils equal, round, and reactive to light. Conjunctivae/corneas clear. Neck: Supple, with full range of motion. No jugular venous distention. Trachea midline. Respiratory: Audible wheezes resolved, decreased A/E at bases bilat  Cardiovascular: Regular rate and rhythm with normal S1/S2 without murmurs, rubs or gallops. Abdomen: Soft, non-tender, non-distended with normal bowel sounds. Musculoskeletal: passive and active ROM x 4 extremities. Skin: Skin color, texture, turgor normal.  No rashes; trace edema bilat.   Neurologic:  Neurovascularly intact without any focal sensory/motor deficits. Cranial nerves: II-XII intact, grossly non-focal.  Psychiatric: Alert and oriented, thought content appropriate, normal insight  Capillary Refill: Brisk,< 3 seconds   Peripheral Pulses: +2 palpable, equal bilaterally     Labs:   Recent Labs     08/14/20  1540  08/15/20  0632  08/16/20  1700 08/17/20  0049 08/17/20  0549   WBC 10.9*  --  9.1  --   --   --   --    HGB 7.3*   < > 7.3*   < > 7.3* 7.3* 6.9*   HCT 23.7*   < > 23.1*   < > 23.8* 24.5* 21.9*     --  121*  --   --   --   --     < > = values in this interval not displayed. Recent Labs     08/16/20  0548 08/16/20  1700 08/17/20  0549    136 139   K 4.5 4.3 4.6    104 107   CO2 19* 18* 20*   BUN 54* 55* 58*   CREATININE 2.8* 2.9* 3.1*   CALCIUM 8.2* 8.2* 8.3*     Recent Labs     08/15/20  0632   AST 31   ALT 26   BILITOT 2.0*   ALKPHOS 53     Recent Labs     08/14/20  1540 08/15/20  0632   INR 1.51* 1.42*     Recent Labs     08/16/20  0548   CKTOTAL 30*       Microbiology:    Blood culture #1: No results found for: [facility]    Blood culture #2:No results found for: Mariza Harris    Organism:No results found for: ORG    No results found for: LABGRAM    MRSA culture only:No results found for: [facility]    Urine culture:   Lab Results   Component Value Date    LABURIN No growth-preliminary No growth  08/14/2020       Respiratory culture: No results found for: CULTRESP    Aerobic and Anaerobic :  No results found for: LABAERO  No results found for: LABANAE    Urinalysis:    No results found for: Sally Sprinkle, BACTERIA, RBCUA, BLOODU, [address] [place] [address]    Radiology:  XR CHEST PORTABLE   Final Result      Stable postoperative sternotomy and cardiac enlargement with visualized pacemaker. Increasing interstitial edema correlate with slight progression of heart failure. Bibasilar atelectasis and effusions persist.   **This report has been created using voice recognition software.  It may contain minor errors which are inherent in voice recognition technology. **      Final report electronically signed by Dr. Bobbi Diehl on 8/17/2020 3:16 AM      XR CHEST PORTABLE   Final Result   1. The lung volumes are slightly diminished. There is slightly worsened opacity at the right lung base which most commonly represents a small right pleural effusion and atelectasis/infiltrate. The lateral aspect of the left lower chest is partially    disturbed by the overlying generator device. Given this limitation, there is slightly worsened opacity within the left lower chest with the differential including pleural fluid and atelectasis/infiltrates. There is no pulmonary vascular congestion. Follow-up chest radiographs recommended to confirm complete resolution. **This report has been created using voice recognition software. It may contain minor errors which are inherent in voice recognition technology. **      Final report electronically signed by Dr. Jewel Mcmullen on 8/16/2020 11:47 AM      US RENAL COMPLETE   Final Result      1. Heterogeneous right perinephric collection correlate with perinephric hematoma. 2. No obstructive uropathy present. 3. Right pleural effusion present. 4. Left upper quadrant free fluid present. **This report has been created using voice recognition software. It may contain minor errors which are inherent in voice recognition technology. **      Final report electronically signed by Dr. Bobbi Diehl on 8/15/2020 6:33 AM      CT INTERPRETATION OF OUTSIDE IMAGES   Final Result   1. There is enlargement of the right kidney with mixed attenuation throughout the right kidney. The high attenuation seen more peripherally suggesting a subcapsular hematoma. There is also a large amount of hemorrhage within the right perinephric space,    right posterior perirenal space and down within the right abdomen/retroperitoneum with extension inferiorly into the right pelvis.  The largest component of the hematoma is within the right abdomen/retroperitoneum tracking inferiorly into the right pelvis    measuring 14.5 x 9.6 x 7.3 cm in longitudinal, transverse and AP dimensions. There is also hemorrhage tracking medially within the abdomen and proximal pelvis. In addition, there is fluid suggesting hemorrhage adjacent to the spleen and tracking    inferiorly along the left colonic gutter. As previously noted no obvious splenic laceration is identified on the noncontrasted imaging. 2. There are bilateral pleural effusions, small to moderate on the left and small on the right with associated consolidation within the lower chest bilaterally. 3.  The bowel gas pattern is nonobstructive. The ascending colon is not seen in its entirety and may be of secured resected. There are a few mildly dilated loops of small bowel and small air-fluid levels within a few loops of small bowel and also within    the transverse colon. Findings suggest an ileus. 4. Numerous additional findings as described in the body of the report. 5. Correlation should be made with the report from [facility] to exclude additional findings. This report was not available at the time of interpretation. **This report has been created using voice recognition software. It may contain minor errors which are inherent in voice recognition technology. **      Final report electronically signed by Dr. Kacey Ball on 8/15/2020 10:03 AM      XR COMPARISON OF OUTSIDE FILMS   Final Result        Us Renal Complete    Result Date: 8/15/2020  PROCEDURE: US RENAL COMPLETE CLINICAL INFORMATION: fatoumata on ckd. COMPARISON: No prior study. TECHNIQUE: Grayscale and color images were obtained of both kidneys. FINDINGS: Limited exam detail secondary to suboptimal acoustic window. RIGHT KIDNEY - 8.7 x 5.3 x 4.8 cm Resistive Index - 0.67 Cortical Thickness - 1.1 cm.  There is a perinephric heterogeneous fluid collection that measures 7.1 x 7.5 x 2.4 cm with slightly heterogeneous appearance. This suggests the perinephric hematoma previously described per history. There is no visualized obstructive uropathy. LEFT KIDNEY - 9.4 x 5.5 x 4.6 cm Resistive Index - 0.64 Cortical Thickness - 1.2 cm There is normal echogenicity of the renal parenchyma bilaterally. There is no thinning of the renal cortex. There is no hydronephrosis. No stones are noted. The urinary bladder is decompressed by a Solis catheter the detail is limited. There is noted right pleural effusion partially visualized. There is free fluid in the left upper quadrant, correlate with ascites versus hematoma. 1. Heterogeneous right perinephric collection correlate with perinephric hematoma. 2. No obstructive uropathy present. 3. Right pleural effusion present. 4. Left upper quadrant free fluid present. **This report has been created using voice recognition software. It may contain minor errors which are inherent in voice recognition technology. ** Final report electronically signed by Dr. Orlando Viera on 8/15/2020 6:33 AM    Ct Interpretation Of Outside Images    Result Date: 8/15/2020  PROCEDURE: CT INTERPRETATION OF OUTSIDE IMAGES CLINICAL INFORMATION: Trauma transfer. COMPARISON: No prior study. TECHNIQUE: Interpretation of an outside CT examination of the abdomen/pelvis without contrast from [facility]. FINDINGS: LIMITATIONS: 1. The lack of intravenous contrast limits evaluation of the solid organs. LUNG BASES: 1. There are bilateral pleural effusions, small to moderate on the left and small on the right with associated consolidation within the lower chest bilaterally. 2. There is a subcentimeter noncalcified nodule within the right lower chest. 3. There is mild cardiomegaly. 4. There is a pacemaker/AICD device. 5. There is a small amount of gas along the anterior aspect of the heart to the right of midline. There is no pneumothorax.  6. Coronary artery calcifications are present however not quantified. There is also calcification along the aortic valve. LIVER/GALLBLADDER/BILIARY TREE: Unremarkable. PANCREAS/SPLEEN: 1. There is fatty infiltration of the pancreas which is otherwise unremarkable. 2. The unopacified spleen is unremarkable. ADRENAL GLANDS/KIDNEYS: 1. The bilateral adrenal glands are unremarkable. 2. The left kidney is atrophic. 3. There is enlargement of the right kidney with mixed attenuation throughout the right kidney. The high attenuation seen more peripherally suggesting a subcapsular hematoma. There is also a large amount of hemorrhage within the right perinephric space, right posterior perirenal space and down within the right abdomen/retroperitoneum with extension inferiorly into the right pelvis. The largest component of the hematoma is within the right abdomen/retroperitoneum tracking inferiorly into the right pelvis  measuring 14.5 x 9.6 x 7.3 cm in longitudinal, transverse and AP dimensions. There is also hemorrhage tracking medially within the abdomen and proximal pelvis. In addition, there is fluid suggesting hemorrhage adjacent to the spleen and tracking inferiorly along the left colonic gutter. As previously noted no obvious splenic laceration is identified on the noncontrasted imaging. BOWEL: 1. The bowel gas pattern is nonobstructive. The ascending colon is not seen in its entirety and may be of secured resected. There are a few mildly dilated loops of small bowel and small air-fluid levels within a few loops of small bowel and also within the transverse colon. Findings suggest an ileus. FREE AIR/FREE FLUID/INFLAMMATION: 1. As previously described. 2. There is no free air. LYMPHADENOPATHY: 1. No pathologically enlarged lymph nodes. ABDOMINAL AORTA: 1. Atheromatous calcification throughout the abdominal aorta, iliac, common femoral, superficial femoral and profunda femoral arteries.  2. There is atheromatous calcification within the celiac trunk and superior mesenteric artery. 3. There is ectasia of the right common iliac artery. ABDOMINAL WALL: 1. There are stranding densities within the subcutaneous tissues suggesting edema which may be related to 3rd spacing. 2. There is gynecomastia bilaterally. MUSCULOSKELETAL: 1. The bony structures are osteopenic. 2. There is levoscoliosis of the lumbar spine. There is 0.7 cm anterolisthesis of L4 on L5. There is a compression fracture of the L1 vertebral body with approximately 30-40% loss of superior height which most likely is chronic. 3. Multilevel degenerative changes. 4. There are syndesmophytes which can be associated with ankylosing spondylitis. OTHER: None. 1. There is enlargement of the right kidney with mixed attenuation throughout the right kidney. The high attenuation seen more peripherally suggesting a subcapsular hematoma. There is also a large amount of hemorrhage within the right perinephric space, right posterior perirenal space and down within the right abdomen/retroperitoneum with extension inferiorly into the right pelvis. The largest component of the hematoma is within the right abdomen/retroperitoneum tracking inferiorly into the right pelvis  measuring 14.5 x 9.6 x 7.3 cm in longitudinal, transverse and AP dimensions. There is also hemorrhage tracking medially within the abdomen and proximal pelvis. In addition, there is fluid suggesting hemorrhage adjacent to the spleen and tracking inferiorly along the left colonic gutter. As previously noted no obvious splenic laceration is identified on the noncontrasted imaging. 2. There are bilateral pleural effusions, small to moderate on the left and small on the right with associated consolidation within the lower chest bilaterally. 3.  The bowel gas pattern is nonobstructive. The ascending colon is not seen in its entirety and may be of secured resected.  There are a few mildly dilated loops of small bowel and small air-fluid levels within a few loops of small bowel and also within the transverse colon. Findings suggest an ileus. 4. Numerous additional findings as described in the body of the report. 5. Correlation should be made with the report from [facility] to exclude additional findings. This report was not available at the time of interpretation. **This report has been created using voice recognition software. It may contain minor errors which are inherent in voice recognition technology. ** Final report electronically signed by Dr. Travis Guevara on 8/15/2020 10:03 AM    Xr Comparison Of Outside Films    Result Date: 8/15/2020  Radiology exam is complete. No Radiologist dictation. Please follow up with ordering provider. With RN in room, patient was updated about and agreed upon the treatment plan, all the questions and concerns were addressed.       Electronically signed by Geni Jin MD on 8/17/2020 at 7:48 AM

## 2020-08-17 NOTE — PLAN OF CARE
Problem: Pain Control  Goal: Maintain pain level at or below patient's acceptable level (or 5 if patient is unable to determine acceptable level)  Outcome: Ongoing  Flowsheets (Taken 8/16/2020 0735 by Wade Martínez RN)  Patient's Stated Pain Goal: No pain  Note: Patient rates pain on 0-10 pain scale. States pain is a 0 on 0-10 scale. States goal is 0. Repositioned for comfort. Will continue to reassess. Problem: Neurological  Goal: Maximum potential motor/sensory/cognitive function  Outcome: Ongoing  Note: Patient alert and oriented x4 with intermittent confusion. On strict bedrest. Will continue to reassess. Problem: Cardiovascular  Goal: Hemodynamic stability  Outcome: Ongoing  Note:   Vitals:    08/16/20 2030   BP: 118/66   Pulse: 80   Resp: 18   Temp: 98.2 °F (36.8 °C)   SpO2: 96%     Will continue to reassess. Problem: Respiratory  Goal: O2 Sat > 90%  Outcome: Ongoing  Note: Oxygen saturation >90% on room air. Will continue to reassess. Problem: GI  Goal: No bowel complications  Outcome: Ongoing  Note: Monitoring bowel movements. Will continue to reassess. Problem: Nutrition  Goal: Optimal nutrition therapy  Outcome: Ongoing  Note: Patient on general diet. Tolerating well. Will continue to reassess. Problem: Skin Integrity/Risk  Goal: No skin breakdown during hospitalization  Outcome: Ongoing  Note: No new skin breakdown noted at this time. Will continue to reassess. Turning patient in bed Q2 hours. Problem: Musculor/Skeletal Functional Status  Goal: Absence of falls  Outcome: Ongoing  Note: Falling star placed outside of patient's room. 2/4 bed rails up. Non-skid socks provided. Call light and personal items with in reach. Bed alarm on.         Problem: Discharge Planning:  Goal: Discharged to appropriate level of care  Description: Discharged to appropriate level of care  Outcome: Ongoing  Note: Patient plans to discharge home with home health when medically stable. Problem: Urinary Elimination:  Goal: Signs and symptoms of infection will decrease  Description: Signs and symptoms of infection will decrease  Outcome: Ongoing  Note: Monitoring intake and output. I/O last 3 completed shifts: In: 3374.4 [P.O.:1510; I.V.:1864.4]  Out: 700 [Urine:700]  I/O this shift:  In: 300 [P.O.:300]  Out: 250 [Urine:250]    Will continue to reassess. Problem: Bleeding:  Goal: Will show no signs and symptoms of excessive bleeding  Description: Will show no signs and symptoms of excessive bleeding  Outcome: Ongoing  Note: Monitoring hemoglobin. No signs or symptoms of excessive bleeding. Will continue to reassess. Care plan reviewed with patient. No concerns voiced.

## 2020-08-17 NOTE — PROGRESS NOTES
Pharmacy Renal Adjustment    Marley Tam is a 80 y.o. male. Pharmacy renally adjust the following medications per P&T approved policy: Pepcid    Recent Labs     08/16/20  1700 08/17/20  0549   BUN 55* 58*       Recent Labs     08/16/20  1700 08/17/20  0549   CREATININE 2.9* 3.1*       Estimated Creatinine Clearance: 20 mL/min (A) (based on SCr of 3.1 mg/dL [facility])).   Calculated CrCl: 19 ml/min using AdjBW    Height:   Ht Readings from Last 1 Encounters:   08/14/20 6' (1.829 m)     Weight:  Wt Readings from Last 1 Encounters:   08/16/20 223 lb 4.8 oz (101.3 kg)       CKD stage: IV         Baseline SCr: Unknown     Plan: Adjustments based on renal function:          Decrease Pepcid IV 20mg BID to Pepcid IV 20mg QD                  Delano ArceD  8/17/2020  3:07 PM

## 2020-08-18 ENCOUNTER — APPOINTMENT (OUTPATIENT)
Dept: CT IMAGING | Age: 85
DRG: 964 | End: 2020-08-18
Payer: MEDICARE

## 2020-08-18 PROBLEM — S37.011A HEMATOMA OF RIGHT KIDNEY: Status: ACTIVE | Noted: 2020-08-18
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
## 2020-08-18 PROBLEM — N17.9 ACUTE KIDNEY INJURY SUPERIMPOSED ON CKD (HCC): Status: ACTIVE | Noted: 2020-08-18

## 2020-08-18 PROBLEM — N18.9 ACUTE KIDNEY INJURY SUPERIMPOSED ON CKD (HCC): Status: ACTIVE | Noted: 2020-08-18

## 2020-08-18 PROBLEM — I95.9 HYPOTENSION: Status: ACTIVE | Noted: 2020-08-18

## 2020-08-18 PROBLEM — S36.892A TRAUMATIC RETROPERITONEAL HEMATOMA: Status: ACTIVE | Noted: 2020-08-18

## 2020-08-18 LAB
ANION GAP SERPL CALCULATED.3IONS-SCNC: 11 MEQ/L (ref 8–16)
ANION GAP SERPL CALCULATED.3IONS-SCNC: 14 MEQ/L (ref 8–16)
ANION GAP SERPL CALCULATED.3IONS-SCNC: 15 MEQ/L (ref 8–16)
BUN BLDV-MCNC: 57 MG/DL (ref 7–22)
BUN BLDV-MCNC: 59 MG/DL (ref 7–22)
BUN BLDV-MCNC: 60 MG/DL (ref 7–22)
CALCIUM SERPL-MCNC: 8.4 MG/DL (ref 8.5–10.5)
CALCIUM SERPL-MCNC: 8.5 MG/DL (ref 8.5–10.5)
CALCIUM SERPL-MCNC: 8.6 MG/DL (ref 8.5–10.5)
CHLORIDE BLD-SCNC: 104 MEQ/L (ref 98–111)
CHLORIDE BLD-SCNC: 104 MEQ/L (ref 98–111)
CHLORIDE BLD-SCNC: 105 MEQ/L (ref 98–111)
CO2: 18 MEQ/L (ref 23–33)
CO2: 20 MEQ/L (ref 23–33)
CO2: 21 MEQ/L (ref 23–33)
CREAT SERPL-MCNC: 2.9 MG/DL (ref 0.4–1.2)
CREAT SERPL-MCNC: 3 MG/DL (ref 0.4–1.2)
CREAT SERPL-MCNC: 3.2 MG/DL (ref 0.4–1.2)
GFR SERPL CREATININE-BSD FRML MDRD: 18 ML/MIN/1.73M2
GFR SERPL CREATININE-BSD FRML MDRD: 20 ML/MIN/1.73M2
GFR SERPL CREATININE-BSD FRML MDRD: 21 ML/MIN/1.73M2
GLUCOSE BLD-MCNC: 61 MG/DL (ref 70–108)
GLUCOSE BLD-MCNC: 92 MG/DL (ref 70–108)
GLUCOSE BLD-MCNC: 99 MG/DL (ref 70–108)
HCT VFR BLD CALC: 24.3 % (ref 42–52)
HCT VFR BLD CALC: 25.4 % (ref 42–52)
HCT VFR BLD CALC: 26.2 % (ref 42–52)
HCT VFR BLD CALC: 26.3 % (ref 42–52)
HEMOGLOBIN: 7.9 GM/DL (ref 14–18)
HEMOGLOBIN: 8.1 GM/DL (ref 14–18)
HEMOGLOBIN: 8.4 GM/DL (ref 14–18)
HEMOGLOBIN: 8.6 GM/DL (ref 14–18)
POTASSIUM SERPL-SCNC: 4.2 MEQ/L (ref 3.5–5.2)
POTASSIUM SERPL-SCNC: 4.3 MEQ/L (ref 3.5–5.2)
POTASSIUM SERPL-SCNC: 4.5 MEQ/L (ref 3.5–5.2)
SODIUM BLD-SCNC: 136 MEQ/L (ref 135–145)
SODIUM BLD-SCNC: 137 MEQ/L (ref 135–145)
SODIUM BLD-SCNC: 139 MEQ/L (ref 135–145)

## 2020-08-18 PROCEDURE — 85014 HEMATOCRIT: CPT

## 2020-08-18 PROCEDURE — 99232 SBSQ HOSP IP/OBS MODERATE 35: CPT | Performed by: SURGERY

## 2020-08-18 PROCEDURE — 2580000003 HC RX 258: Performed by: HOSPITALIST

## 2020-08-18 PROCEDURE — 80048 BASIC METABOLIC PNL TOTAL CA: CPT

## 2020-08-18 PROCEDURE — 99233 SBSQ HOSP IP/OBS HIGH 50: CPT | Performed by: HOSPITALIST

## 2020-08-18 PROCEDURE — 99232 SBSQ HOSP IP/OBS MODERATE 35: CPT | Performed by: INTERNAL MEDICINE

## 2020-08-18 PROCEDURE — 2580000003 HC RX 258: Performed by: PHYSICIAN ASSISTANT

## 2020-08-18 PROCEDURE — 6370000000 HC RX 637 (ALT 250 FOR IP): Performed by: NURSE PRACTITIONER

## 2020-08-18 PROCEDURE — 94760 N-INVAS EAR/PLS OXIMETRY 1: CPT

## 2020-08-18 PROCEDURE — 92526 ORAL FUNCTION THERAPY: CPT

## 2020-08-18 PROCEDURE — 2500000003 HC RX 250 WO HCPCS: Performed by: PHYSICIAN ASSISTANT

## 2020-08-18 PROCEDURE — 36415 COLL VENOUS BLD VENIPUNCTURE: CPT

## 2020-08-18 PROCEDURE — APPSS60 APP SPLIT SHARED TIME 46-60 MINUTES: Performed by: NURSE PRACTITIONER

## 2020-08-18 PROCEDURE — 99221 1ST HOSP IP/OBS SF/LOW 40: CPT | Performed by: UROLOGY

## 2020-08-18 PROCEDURE — 74176 CT ABD & PELVIS W/O CONTRAST: CPT

## 2020-08-18 PROCEDURE — 85018 HEMOGLOBIN: CPT

## 2020-08-18 PROCEDURE — 2060000000 HC ICU INTERMEDIATE R&B

## 2020-08-18 RX ORDER — PANTOPRAZOLE SODIUM 40 MG/1
40 TABLET, DELAYED RELEASE ORAL
Status: DISCONTINUED | OUTPATIENT
Start: 2020-08-19 | End: 2020-08-21 | Stop reason: HOSPADM

## 2020-08-18 RX ADMIN — LEVOTHYROXINE SODIUM 100 MCG: 100 TABLET ORAL at 05:36

## 2020-08-18 RX ADMIN — DOCUSATE SODIUM 100 MG: 100 CAPSULE, LIQUID FILLED ORAL at 09:45

## 2020-08-18 RX ADMIN — METOPROLOL TARTRATE 25 MG: 25 TABLET, FILM COATED ORAL at 21:26

## 2020-08-18 RX ADMIN — ATORVASTATIN CALCIUM 20 MG: 20 TABLET, FILM COATED ORAL at 09:43

## 2020-08-18 RX ADMIN — AMIODARONE HYDROCHLORIDE 400 MG: 200 TABLET ORAL at 09:43

## 2020-08-18 RX ADMIN — FAMOTIDINE 20 MG: 10 INJECTION INTRAVENOUS at 09:45

## 2020-08-18 RX ADMIN — SODIUM CHLORIDE: 9 INJECTION, SOLUTION INTRAVENOUS at 14:55

## 2020-08-18 RX ADMIN — METOPROLOL TARTRATE 25 MG: 25 TABLET, FILM COATED ORAL at 09:43

## 2020-08-18 RX ADMIN — SODIUM CHLORIDE: 9 INJECTION, SOLUTION INTRAVENOUS at 00:01

## 2020-08-18 RX ADMIN — Medication 10 ML: at 09:43

## 2020-08-18 RX ADMIN — Medication 10 ML: at 21:27

## 2020-08-18 RX ADMIN — ALLOPURINOL 100 MG: 100 TABLET ORAL at 09:43

## 2020-08-18 ASSESSMENT — PAIN SCALES - GENERAL: PAINLEVEL_OUTOF10: 0

## 2020-08-18 NOTE — PROGRESS NOTES
Hospitalist Progress Note      Patient:  Nacho Navarrete    Unit/Bed:4K-23/023-A  YOB: 1930  MRN: [id number]   Acct: [de-identified]   PCP: Lizbet Lopez  Date of Admission: 8/14/2020    Assessment/Plan:    1. Worsening (mild) CALVIN on chronic kidney disease stage IV: likely multifactorial; initially pre-renal d/t hemorrhage and hypotension. Pt s/o IVF and transfusion w/ no recovery; Hx of HFrEF per TTE from June 2020. Possible cardiorenal now? Solis in place 600 cc/24 hrs. FeNa from yesterday c/w pre-renal etiology vs cardiorenal w/ decreased ECV? Off IVF now. Will give lasix 40 mg IV once and reassess response. nephro also following  8/17: Cr 3.1 from 2.9 yesterday; urine lytes pending; U/O 800 cc /last 24 hr. Will hold off on lasix for now. On R/A w/o wheezing. BP well-controlled. Will trend   8/18: Cr down to 3.0 improved w/ hydration. CCM. Respiratory status remains stable      2. Hypotension due to blood loss anemia. stable on midodrine 5 TID, CCM  3. Acute blood loss anemia from retroperitoneal bleed/Perinephric hematoma: Hb stable at 7. 2. will trend closely. HD stable; pt denies symptoms. ASA/VKA held  8/17: repeat Hb 6.9 today. No clinically evident bleed. Asymptomatic. Will give PRBC x1U and recheck Hb post-Tx   8/18: Hb 8.1 post-Tx, Hd stable. No clinically evident bleed. Will monitor      4. Hx of chronic Atrial fibrillation w/ Supratherapeutic INR POA: CVR on BB/amiodarone; off Drug Response Dx s/p reversal w/ INR 1.42. given age and hx of fall, consider keeping permanently off Drug Response Dx   5. Hx of hypothyroidism: on levothyroxine, TSH sent  6. Hx of HFrEF w/ query acute decompensation 2/2 volume overload: on BB, managing as discussed above  8/18: clinically not volume-overload; remains on R/A; will continue keeping off diuretic  7. Hx of CAD: on statin/BB. Off ASA  8.  Code status: DNR-CCA, palliative to see re: code status and also goals and philosophy of care  9. Obersity w/ BMI 30.2: Counseling offered   10. PT/OT to follow    Chief Complaint: fall/polytrauma    Initial H and P:-    Admitted under ICU/trauma team on 8/14/2020 for hypotension s/p fall w/ retroperitoneal bleed. Noted nephro also following. Pt was transferred to   overnight. Subjective (past 24 hours):    No issues overnight; sleeping comfortably lying flat; Denies SOB/CP/cough/palpitation/fever/chills      Past medical history, family history, social history and allergies reviewed again and is unchanged since admission. ROS (12 point review of systems completed. Pertinent positives noted. Otherwise ROS is negative)     Medications:  Reviewed    Infusion Medications    sodium chloride 75 mL/hr at 08/18/20 0001     Scheduled Medications    sodium chloride  20 mL Intravenous Once    famotidine (PEPCID) injection  20 mg Intravenous Daily    midodrine  5 mg Oral TID WC    sodium chloride flush  10 mL Intravenous 2 times per day    allopurinol  100 mg Oral Daily    amiodarone  400 mg Oral Daily    atorvastatin  20 mg Oral Daily    docusate sodium  100 mg Oral Daily    levothyroxine  100 mcg Oral Daily    metoprolol tartrate  25 mg Oral BID     PRN Meds: zolpidem, albuterol, sodium chloride flush, acetaminophen, polyethylene glycol, promethazine **OR** ondansetron, fentanNYL      Intake/Output Summary (Last 24 hours) at 8/18/2020 0746  Last data filed at 8/18/2020 0317  Gross per 24 hour   Intake 1617.07 ml   Output 650 ml   Net 967.07 ml       Diet:  DIET GENERAL;    Exam:  /68   Pulse 80   Temp 97.9 °F (36.6 °C) (Oral)   Resp 26   Ht 6' (1.829 m)   Wt 231 lb (104.8 kg)   SpO2 95% Comment: no o2 indicated at this time   BMI 31.33 kg/m²   General appearance: Obese, No apparent distress, appears stated age and cooperative. HEENT: Pupils equal, round, and reactive to light. Conjunctivae/corneas clear. Neck: Supple, with full range of motion. No jugular venous distention. Trachea midline. Respiratory: Audible wheezes resolved, decreased A/E at bases bilat  Cardiovascular: Regular rate and rhythm with normal S1/S2 without murmurs, rubs or gallops. Abdomen: Soft, non-tender, non-distended with normal bowel sounds. Musculoskeletal: passive and active ROM x 4 extremities. Skin: Skin color, texture, turgor normal.  No rashes; trace edema bilat. Neurologic:  Neurovascularly intact without any focal sensory/motor deficits. Cranial nerves: II-XII intact, grossly non-focal.  Psychiatric: Alert and oriented, thought content appropriate, normal insight  Capillary Refill: Brisk,< 3 seconds   Peripheral Pulses: +2 palpable, equal bilaterally     Labs:   Recent Labs     08/17/20  1847 08/18/20  0021 08/18/20  0608   HGB 8.1* 7.9* 8.1*   HCT 25.1* 24.3* 25.4*     Recent Labs     08/17/20  0549 08/17/20  1847 08/18/20  0021    138 136   K 4.6 4.6 4.5    106 104   CO2 20* 20* 21*   BUN 58* 59* 57*   CREATININE 3.1* 3.2* 3.0*   CALCIUM 8.3* 8.5 8.4*     No results for input(s): AST, ALT, BILIDIR, BILITOT, ALKPHOS in the last 72 hours. No results for input(s): INR in the last 72 hours. Recent Labs     08/16/20  0548   CKTOTAL 30*       Microbiology:    Blood culture #1: No results found for: [facility]    Blood culture #2:No results found for: [facility]    Organism:No results found for: ORG    No results found for: LABGRAM    MRSA culture only:No results found for: [facility]    Urine culture:   Lab Results   Component Value Date    LABURIN No growth-preliminary No growth  08/14/2020       Respiratory culture: No results found for: CULTRESP    Aerobic and Anaerobic :  No results found for: LABAERO  No results found for: LABANAE    Urinalysis:    No results found for: Jolena Pancake, BACTERIA, RBCUA, BLOODU, [address], [place] [place] [address]    Radiology:  XR CHEST PORTABLE   Final Result      Stable postoperative sternotomy and cardiac enlargement with visualized pacemaker.       Increasing interstitial edema correlate with slight progression of heart failure. Bibasilar atelectasis and effusions persist.   **This report has been created using voice recognition software. It may contain minor errors which are inherent in voice recognition technology. **      Final report electronically signed by Dr. Mirna Guzmán on 8/17/2020 3:16 AM      XR CHEST PORTABLE   Final Result   1. The lung volumes are slightly diminished. There is slightly worsened opacity at the right lung base which most commonly represents a small right pleural effusion and atelectasis/infiltrate. The lateral aspect of the left lower chest is partially    disturbed by the overlying generator device. Given this limitation, there is slightly worsened opacity within the left lower chest with the differential including pleural fluid and atelectasis/infiltrates. There is no pulmonary vascular congestion. Follow-up chest radiographs recommended to confirm complete resolution. **This report has been created using voice recognition software. It may contain minor errors which are inherent in voice recognition technology. **      Final report electronically signed by Dr. Ernestina Jean-Baptiste on 8/16/2020 11:47 AM      US RENAL COMPLETE   Final Result      1. Heterogeneous right perinephric collection correlate with perinephric hematoma. 2. No obstructive uropathy present. 3. Right pleural effusion present. 4. Left upper quadrant free fluid present. **This report has been created using voice recognition software. It may contain minor errors which are inherent in voice recognition technology. **      Final report electronically signed by Dr. Mirna Guzmán on 8/15/2020 6:33 AM      CT INTERPRETATION OF OUTSIDE IMAGES   Final Result   1. There is enlargement of the right kidney with mixed attenuation throughout the right kidney. The high attenuation seen more peripherally suggesting a subcapsular hematoma.  There is also a large amount of hemorrhage within the right perinephric space,    right posterior perirenal space and down within the right abdomen/retroperitoneum with extension inferiorly into the right pelvis. The largest component of the hematoma is within the right abdomen/retroperitoneum tracking inferiorly into the right pelvis    measuring 14.5 x 9.6 x 7.3 cm in longitudinal, transverse and AP dimensions. There is also hemorrhage tracking medially within the abdomen and proximal pelvis. In addition, there is fluid suggesting hemorrhage adjacent to the spleen and tracking    inferiorly along the left colonic gutter. As previously noted no obvious splenic laceration is identified on the noncontrasted imaging. 2. There are bilateral pleural effusions, small to moderate on the left and small on the right with associated consolidation within the lower chest bilaterally. 3.  The bowel gas pattern is nonobstructive. The ascending colon is not seen in its entirety and may be of secured resected. There are a few mildly dilated loops of small bowel and small air-fluid levels within a few loops of small bowel and also within    the transverse colon. Findings suggest an ileus. 4. Numerous additional findings as described in the body of the report. 5. Correlation should be made with the report from [facility] to exclude additional findings. This report was not available at the time of interpretation. **This report has been created using voice recognition software. It may contain minor errors which are inherent in voice recognition technology. **      Final report electronically signed by Dr. April Nowak on 8/15/2020 10:03 AM      XR COMPARISON OF OUTSIDE FILMS   Final Result        Us Renal Complete    Result Date: 8/15/2020  PROCEDURE: US RENAL COMPLETE CLINICAL INFORMATION: fatoumata on ckd. COMPARISON: No prior study. TECHNIQUE: Grayscale and color images were obtained of both kidneys.   FINDINGS: Limited exam detail secondary to pacemaker/AICD device. 5. There is a small amount of gas along the anterior aspect of the heart to the right of midline. There is no pneumothorax. 6. Coronary artery calcifications are present however not quantified. There is also calcification along the aortic valve. LIVER/GALLBLADDER/BILIARY TREE: Unremarkable. PANCREAS/SPLEEN: 1. There is fatty infiltration of the pancreas which is otherwise unremarkable. 2. The unopacified spleen is unremarkable. ADRENAL GLANDS/KIDNEYS: 1. The bilateral adrenal glands are unremarkable. 2. The left kidney is atrophic. 3. There is enlargement of the right kidney with mixed attenuation throughout the right kidney. The high attenuation seen more peripherally suggesting a subcapsular hematoma. There is also a large amount of hemorrhage within the right perinephric space, right posterior perirenal space and down within the right abdomen/retroperitoneum with extension inferiorly into the right pelvis. The largest component of the hematoma is within the right abdomen/retroperitoneum tracking inferiorly into the right pelvis  measuring 14.5 x 9.6 x 7.3 cm in longitudinal, transverse and AP dimensions. There is also hemorrhage tracking medially within the abdomen and proximal pelvis. In addition, there is fluid suggesting hemorrhage adjacent to the spleen and tracking inferiorly along the left colonic gutter. As previously noted no obvious splenic laceration is identified on the noncontrasted imaging. BOWEL: 1. The bowel gas pattern is nonobstructive. The ascending colon is not seen in its entirety and may be of secured resected. There are a few mildly dilated loops of small bowel and small air-fluid levels within a few loops of small bowel and also within the transverse colon. Findings suggest an ileus. FREE AIR/FREE FLUID/INFLAMMATION: 1. As previously described. 2. There is no free air. LYMPHADENOPATHY: 1. No pathologically enlarged lymph nodes. ABDOMINAL AORTA: 1.  Atheromatous nonobstructive. The ascending colon is not seen in its entirety and may be of secured resected. There are a few mildly dilated loops of small bowel and small air-fluid levels within a few loops of small bowel and also within the transverse colon. Findings suggest an ileus. 4. Numerous additional findings as described in the body of the report. 5. Correlation should be made with the report from [facility] to exclude additional findings. This report was not available at the time of interpretation. **This report has been created using voice recognition software. It may contain minor errors which are inherent in voice recognition technology. ** Final report electronically signed by Dr. April Nowak on 8/15/2020 10:03 AM    Xr Comparison Of Outside Films    Result Date: 8/15/2020  Radiology exam is complete. No Radiologist dictation. Please follow up with ordering provider. With RN in room, patient was updated about and agreed upon the treatment plan, all the questions and concerns were addressed.       Electronically signed by Corinne Hipp, MD on 8/18/2020 at 7:46 AM

## 2020-08-18 NOTE — FLOWSHEET NOTE
Pt was alone and in bed at the time of the visit. He was dealing with Retroperitoneal bleeding. He wanted prayer to cope and heal. He was anointed and received communion. 08/18/20 1522   Encounter Summary   Services provided to: Patient   Referral/Consult From: Rounding   Place of [address] [place] [address] Visiting Yes  (8/18 )   Complexity of Encounter Low   Length of Encounter 15 minutes   Advance Care Planning Yes   Routine   Type Follow up   Assessment Approachable;Calm   Intervention [place];Prayer;Nurtured hope   Outcome Acceptance;Expressed gratitude;Encouraged; Hopeful;Receptive   Sacraments   Sacrament of Sick-Anointing Anointed   .

## 2020-08-18 NOTE — PROGRESS NOTES
Renal Progress Note  [facility]    Patient :  Tracey Monroe; 80 y.o. MRN# [id number]  Location:  [facility]/569-W  Attending:  Frank Brennan MD  Admit Date:  8/14/2020   Hospital Day: 4      Subjective:     Nephrology is following the patient for CALVIN on CKD. This is a late entry. Patient was seen earlier this morning. CT results reviewed, possible compression of right kidney from hematoma. Outpatient Medications:     Medications Prior to Admission: polyethylene glycol (GLYCOLAX) 17 g packet, Take 17 g by mouth daily  allopurinol (ZYLOPRIM) 100 MG tablet, Take 100 mg by mouth daily  atorvastatin (LIPITOR) 20 MG tablet, Take 20 mg by mouth daily  metoprolol tartrate (LOPRESSOR) 25 MG tablet, Take 25 mg by mouth 2 times daily Hold if SBP <100 or DBP<60  docusate sodium (COLACE) 100 MG capsule, Take 100 mg by mouth daily  levothyroxine (SYNTHROID) 100 MCG tablet, Take 100 mcg by mouth Daily  acetaminophen (TYLENOL) 500 MG tablet, Take 500 mg by mouth every 6 hours as needed for Pain  meclizine (ANTIVERT) 12.5 MG tablet, Take 12.5 mg by mouth 3 times daily as needed for Dizziness  warfarin (COUMADIN) 5 MG tablet, Take 5 mg by mouth daily  bumetanide (BUMEX) 2 MG tablet, Take 2 mg by mouth daily (with breakfast)   bumetanide (BUMEX) 1 MG tablet, Take 1 mg by mouth Daily with lunch  zolpidem (AMBIEN) 5 MG tablet, Take 5 mg by mouth nightly. potassium citrate (UROCIT-K) 10 MEQ (1080 MG) extended release tablet, Take 10 mEq by mouth daily  amiodarone (CORDARONE) 200 MG tablet, Take 400 mg by mouth daily  aspirin 81 MG EC tablet, Take 81 mg by mouth nightly  nitroGLYCERIN (NITROSTAT) 0.4 MG SL tablet, Place 0.4 mg under the tongue every 5 minutes as needed for Chest pain up to max of 3 total doses. If no relief after 1 dose, call 911.     Current Medications:     Scheduled Meds:    [START ON 8/19/2020] pantoprazole  40 mg Oral QAM AC    sodium chloride  20 mL Intravenous Once    midodrine  5 mg Oral TID WC    sodium chloride flush  10 mL Intravenous 2 times per day    allopurinol  100 mg Oral Daily    amiodarone  400 mg Oral Daily    atorvastatin  20 mg Oral Daily    docusate sodium  100 mg Oral Daily    levothyroxine  100 mcg Oral Daily    metoprolol tartrate  25 mg Oral BID     Continuous Infusions:    sodium chloride 75 mL/hr at 20 0001     PRN Meds:  zolpidem, albuterol, sodium chloride flush, acetaminophen, polyethylene glycol, promethazine **OR** ondansetron, fentanNYL    Input/Output:       I/O last 3 completed shifts: In: 1617.1 [P.O.:670; I.V.:637.1; Blood:310]  Out: 650 [Urine:650]. Patient Vitals for the past 96 hrs (Last 3 readings):   Weight   20 0317 231 lb (104.8 kg)   20 0342 223 lb 4.8 oz (101.3 kg)   08/15/20 0448 217 lb 11.2 oz (98.7 kg)       Vital Signs:   Temperature:  Temp: 97.4 °F (36.3 °C)  TMax:   Temp (24hrs), Av.6 °F (36.4 °C), Min:97.4 °F (36.3 °C), Max:97.9 °F (36.6 °C)    Respirations:  Resp: 21  Pulse:   Pulse: 80  BP:    BP: 133/72  BP Range: Systolic (73XQC), OMF:788 , Min:110 , GSD:713       Diastolic (85ANO), FPO:02, Min:59, Max:83      Physical Examination:     General:  Asleep, appears comfortable  HEENT: NC/AT/ MMM  Chest:               Has some wheezing  Cardiac:  irregular  Abdomen: Soft, non-tender,  Neuro:  No facial droop, No Asterixis  SKIN:  No rashes, good skin turgor. Extremities:  trace edema, no cyanosis    Labs:       Recent Labs     20  0021 20  0608 20  1240   HGB 7.9* 8.1* 8.6*   HCT 24.3* 25.4* 26.2*      BMP:   Recent Labs     20  1847 20  0021 20  1014    136 139   K 4.6 4.5 4.2    104 104   CO2 20* 21* 20*   BUN 59* 57* 59*   CREATININE 3.2* 3.0* 3.2*   GLUCOSE 97 92 61*   CALCIUM 8.5 8.4* 8.6      Phosphorus:   No results for input(s): PHOS in the last 72 hours. Magnesium:  No results for input(s): MG in the last 72 hours.   Albumin:    No results for input(s): LABALBU in the last 72 hours. BNP:    No results found for: BNP  OLIVIA:    No results found for: OLIVIA  SPEP:  Lab Results   Component Value Date    PROT 4.7 08/15/2020     UPEP:   No results found for: LABPE  C3:   No results found for: C3  C4:   No results found for: C4  MPO ANCA:   No results found for: MPO  PR3 ANCA:   No results found for: PR3  Anti-GBM:   No results found for: GBMABIGG  Hep BsAg:       No results found for: HEPBSAG  Hep C AB:        No results found for: HEPCAB    Urinalysis/Chemistries:    No results found for: NITRU, COLORU, PHUR, LABCAST, WBCUA, RBCUA, MUCUS, TRICHOMONAS, YEAST, BACTERIA, CLARITYU, SPECGRAV, LEUKOCYTESUR, UROBILINOGEN, BILIRUBINUR, BLOODU, GLUCOSEU, KETUA, AMORPHOUS  Urine Sodium:   No results found for: PALLAVI  Urine Potassium:  No results found for: KUR  Urine Chloride:  No results found for: CLUR  Urine Osmolarity: No results found for: OSMOU  Urine Protein:   No components found for: TOTALPROTEIN, URINE   Urine Creatinine:   No results found for: LABCREA  Urine Eosinophils:  No components found for: UEOS        Impression and Plan:  1. CALVIN on CKD :slowly worsening, urine lytes show prerenal, he was started on IV fluids overnight, not much improvement, repeat 3.2 I think the hematoma compressing right kidney is leading to worsening renal function, ? IR drainage of hematoma. Awaiting surgery recs    2. Hypotension on Midodrine  3. Acute blood loss anemia: requiring PRBC transfusions  4. Mild metabolic acidosis: monitor  5. Subcapsular renal hematoma and retroperitoneal hematoma  6. Systolic CHF, EF 72-59%  7. Afib  8. CAD/CABG  9. S/p fall        Please don't hesitate to call with any questions.   Electronically signed by Jo Ann Padilla DO on 8/18/2020 at 2:28 PM

## 2020-08-18 NOTE — PLAN OF CARE
Problem: Pain Control  Goal: Maintain pain level at or below patient's acceptable level (or 5 if patient is unable to determine acceptable level)  Outcome: Ongoing  Flowsheets (Taken 8/16/2020 9927 by Cade De La Vega RN)  Patient's Stated Pain Goal: No pain  Note: Pain Assessment: 0-10  Pain Level: 0   Patient's Stated Pain Goal: No pain   Is pain goal met at this time? Yes     Non-Pharmaceutical Pain Intervention(s): Rest, Repositioned       Problem: Neurological  Goal: Maximum potential motor/sensory/cognitive function  Outcome: Ongoing  Note: Patient is oriented x4 but does have intermittent confusion. Problem: Cardiovascular  Goal: No DVT, peripheral vascular complications  Outcome: Ongoing  Note: No signs of DVT. SCDs on for DVT prophylaxis. Problem: Cardiovascular  Goal: Hemodynamic stability  Outcome: Ongoing  Note: BP stable. On continuous cardiac monitoring. Problem: Respiratory  Goal: No pulmonary complications  Outcome: Ongoing  Note: Patient is currently on RA. No pulmonary complications. Problem: Respiratory  Goal: O2 Sat > 90%  Outcome: Ongoing  Note: O2 >90% on RA. Problem: GI  Goal: No bowel complications  Outcome: Ongoing  Note: Patient had bowel movement overnight. Problem: Nutrition  Goal: Optimal nutrition therapy  Outcome: Ongoing  Note: Patients nutrition appears adequate. Problem: Skin Integrity/Risk  Goal: No skin breakdown during hospitalization  Outcome: Ongoing  Note: No new signs of skin breakdown. Turning patient every hour and applying protective barrier cream to bottom. Problem: Musculor/Skeletal Functional Status  Goal: Absence of falls  Outcome: Ongoing  Note: No falls this shift. Call light in reach, bed alarm on, bed in lowest position. Rounding hourly.       Problem: Discharge Planning:  Goal: Discharged to appropriate level of care  Description: Discharged to appropriate level of care  Outcome: Ongoing  Note: Patient is from [facility].

## 2020-08-18 NOTE — PROGRESS NOTES
Christina Maxwell  Daily Progress Note    Pt Name: Mana Arambula  Medical Record Number: [id number]  Date of Birth 2/21/1930   Today's Date: 8/18/2020    HD: # 4    CC: None offered    ASSESSMENT  Active Hospital Problems    Diagnosis Date Noted    Fall [W19. XXXA] 08/18/2020    Traumatic retroperitoneal hematoma [S36.892A] 08/18/2020    Hematoma of right kidney [S37.011A] 08/18/2020    Acute kidney injury superimposed on CKD ([facility] [address] [N17.9, N18.9] 08/18/2020    Hypotension [I95.9] 08/18/2020    Acute blood loss anemia [D62]     Retroperitoneal bleeding [R58] 08/14/2020       PLAN  Patient admitted under Trauma Services to the ICU with Tele   - Patient now on 4K     Retroperitoneal hematoma & subcapsular renal hematoma   - Repeat abdominal CT this morning notes decreasing size of retroperitoneal hematoma              - Serial H&Hs continue - stable above 8.0 today   - Transfused 1 unit PRBCs 8/16, 1 unit PRBCs 8/17   - Transfuse as necessary, keep Hgb >7.0              - Vital signs monitoring              - Given Kcentra and vitamin K on admission for INR of 1.51              - TEG parameters within normal range on admission   - May increase activity with stability noted on CT today    CALVIN on CKD   - Nephrology assisting with management   - Felt to be multifactorial, blood loss major contributor as well as diurectic usage   - Diuretics on hold   - Repeat labs in AM     Leukocytosis, resolved              - 10.9 on admission, 9.1 8/15   - Likely reactive   - Afebrile this admission     Acute blood loss anemia              - Serial H&H now remains above 8.0   - Transfused 1 unit PRBCs 8/16, 1 unit PRBCs 8/17   - Transfuse as necessary, keep Hgb >7.0    Hypotension - resolved   - Likely from blood loss   - Continues on Midodrine    Chronic atrial fibrillation, rate controlled   - Hold [address] for now   - Hospitalist assisting with management   - Continue beta blocker and Amiodarone    Hypothryoidism   - Continue Synthroid    HFrEF - not acutely decompensated   - Diuretics on hold for CALVIN on CKD   - Continue beta blocker    CAD   - Continue Lipitor   - Hold Aspirin     Consults: Intensivist, Hospitalist, Nephrology     Pain Management              - Fentanyl, Tylenol     Prophylaxis: SCD's, Incentive Spirometry, Colace, Pepcid, Zofran     General diet     Gentle IVF Management  Regular Neurovascular Checks  Repeat Labs Tomorrow AM  PT/OT - can eval and treat now  Increase activity as tolerated     Planned Discharge pending clinical course. - From [facility] with plans to return at discharge       SUBJECTIVE  Patient seen on 4K this morning. He is asleep upon entering room. Awakens easily. Very concerned about his bowels. Discussed with him about advancing his activity today as his CT scan was stable. Denies headache, dizziness, chest pain, shortness of breath, abdominal pain, nausea, vomiting. He is tolerating a general diet without any nausea or vomiting. Hospitalist and Nephrology on board. Case discussed with Trauma surgeon Dr. Darien Melendrez. Wt Readings from Last 3 Encounters:   08/18/20 231 lb (104.8 kg)     Temp Readings from Last 3 Encounters:   08/18/20 97.4 °F (36.3 °C) (Oral)     BP Readings from Last 3 Encounters:   08/18/20 133/72     Pulse Readings from Last 3 Encounters:   08/18/20 80       24 HR INTAKE/OUTPUT :     Intake/Output Summary (Last 24 hours) at 8/18/2020 1336  Last data filed at 8/18/2020 0317  Gross per 24 hour   Intake 1417.07 ml   Output 650 ml   Net 767.07 ml     DIET GENERAL;    OBJECTIVE  CURRENT VITALS /72   Pulse 80   Temp 97.4 °F (36.3 °C) (Oral)   Resp 21   Ht 6' (1.829 m)   Wt 231 lb (104.8 kg)   SpO2 99%   BMI 31.33 kg/m²        GENERAL: Asleep but awakens easily. NAD. Lying in hospital bed. NEURO: Follows commands. ZABALA spontaneously. CARDIO: Irregularly irregular rate and rhythm with no murmurs.  Peripheral pulses attenuation collection in the retroperitoneum on the right this is inferior and medial to the right kidney. This extends into the pelvis at the level the pelvic rim. This is decreased in size. This is consistent with a retroperitoneal    hematoma. On the axial images at its largest it measures 9.7 x 5.6 cm. At the same location on the prior examination, this measured 10 x 7.6 cm.         There is improving surrounding fat stranding in the mesentery. There is improving fat stranding anterior to the inferior vena cava and abdominal aorta.         There is a component of the hematoma which surrounds the right kidney deep to the renal fascia. This could've been the source of the hemorrhage. There is thinning of the renal cortex bilaterally. This is relatively symmetric. There is no hydronephrosis    on either side. No underlying mass lesion is noted on the right. This assessment is limited due to the lack of IV contrast. There is no contour deforming renal lesion on the left.         The small bowel loops are relatively decompressed. There is no free air. The IVC and aorta are of normal caliber.  There is calcified atherosclerosis of the aorta. There is no adenopathy.              The urinary bladder is collapsed around a Solis catheter. There is a small amount of pelvic free fluid. There are surgical clips in the pelvis  there is diverticulosis of the colon. There is no evidence of diverticulitis. There is diffuse anasarca. There     is osseous demineralization. There is an old compression fracture of L1.  No suspicious osseous lesions are present.                        Impression         1. Large right-sided retroperitoneal hematoma. This has decreased in size compared to examination from 3 days ago. 2. There is a component which surrounds, and possibly compresses, the right kidney. Question with patient's creatinine is recommended.  No underlying mass lesion is identified on this noncontrast exam.    3. Enlarging moderate-sized bilateral pleural effusions with bibasilar compressive atelectasis.                     **This report has been created using voice recognition software. It may contain minor errors which are inherent in voice recognition technology. **         Final report electronically signed by Dr. Vinnie Hurst on 8/18/2020 10:56 AM         Electronically signed by MENDEL Carias CNP on 8/18/2020 at 1:36 PM     Patient seen and examined independently by me earlier thsi AM. Above discussed and I agree with Domi Guillaume CNP. See my additional comments below for updated orders and plan. Labs, cultures, and radiographs where available were reviewed. I discussed patient concerns with the patient's nurse and instructions were given. Please see our orders for the updated patient care plan. -Repeat CT imaging appears stable. No signs of continued active bleeding. Okay to increase activity from my standpoint. Attempted therapy. Nephrology following. Ask urology to follow along as well secondary to the kidney displacement. SCDs for DVT prophylaxis. Advancing diet. Neurovascular checks. Discharge planning in process.     Electronically signed by Joy Larios MD on 8/18/20 at 3:41 PM EDT

## 2020-08-18 NOTE — PROGRESS NOTES
[address] [place] THERAPY  [facility] ICU STEPDOWN TELEMETRY 4K  DAILY NOTE    TIME   SLP Individual Minutes  Time In: 9739  Time Out: 2627  Minutes: 8  Timed Code Treatment Minutes: 0 Minutes       Date: 2020  Patient Name: Fito Barger      CSN: [id number]   : 1930  (80 y.o.)  Gender: male   Referring Physician:  SUNIL Agosto  Diagnosis: Retroperitoneal bleeding  Secondary Diagnosis: Dysphagia  Precautions: Fall risk, aspiration precautions  Current Diet: Regular with thin liquids - advanced  per referring physician   Swallowing Strategies: Full Upright Position, Small Bite/Sip, Pulmonary Monitoring, Limit Distractions and Monitor for Fatigue   Date of Last MBS: Not Applicable    Pain:  No pain reported. Subjective:  Pt resting in bed upon arrival, awake and alert. Hearing acuity levels significantly reduced, in which multiple repetitions/rephrasing required of auditory prompts/questions to ensure comprehension. Most recent BSE completed 8/15 recommending clear liquid diet, advancing to soft and bite size with thin liquids as it became indicated; Epic order reads regular with thin liquids. TRESA Kaiser reporting overall success with PO intake, no overt s/s aspiration detected. Short-Term Goals:  SHORT TERM GOAL #1:  Goal 1: Patient will safely consume soft and bite size diet with thin liquids (ensure patient is cleared from clear liquids) without difficulty or s/s of aspiration to ensure adequate nutrition and hydration. GOAL MET  REVISED GOAL: Patient will safely consume regular diet with thin liquids without overt s/s aspiration to ensure adequate nutrition/hydration measures. INTERVENTIONS: Dietary analysis facilitated to determine appropriateness to continue with established diet level. Prior to intake, head of bed elevated to approximate 90 degree postural placement.  The following was consumed:  Saltine crackers: x2 crackers consumed with slight impulsivity noted as detected by LARGE bite size. Oral phase henceforth prolonged with unorganized mastication pattern (anterior chew) detected. Deficits detected initially re: cohesive bolus formation (mod oral residuals spread diffusely throughout cavity), improving in bolus control and manipulation as trials progressed. Positive sensation for stasis with clearance of liquid assist. No overt s/s aspiration  Thin H20 via straw: x8 oz consumed with relatively timely pharyngeal swallow trigger; no overt s/s aspiration     No overt s/s of aspiration or distress with PO trials. Given pt's goals of care [facility]), instrumental exam is unlikely to alter POC. Pt's swallow physiology appears functional to support goal for comfortable oral intake without distress. Recommend continuation of regular diet with thin liquids. SHORT TERM GOAL #2:  Goal 2: Patient will complete advanced solid trials with with SLP only to determine appropriateness of diet advancement. GOAL MET  NEW GOAL: Monitor pulmonary status; consider completion of alternate evaluation should it become indicated as it r/t code status (DNR-CCA)  INTERVENTIONS: RN [facility] denying any acute changes r/t pulmonary status/respiratory function. Will continue to monitor. Long-Term Goals:  No LTG established given short ELOS       EDUCATION:  Learner: Patient  Education:  Reviewed diet and strategies, Reviewed ST goals and Plan of Care and Reviewed recommendations for follow-up  Evaluation of Education: Demonstrates with assistance, Needs further instruction and Family not present    ASSESSMENT/PLAN:  Activity Tolerance:  Patient tolerance of  treatment: good. Cooperative with ST and POC. Assessment/Plan: Patient progressing toward established goals. Continues to require skilled care of licensed speech pathologist to progress toward achievement of established goals and plan of care. .     Plan for Next Session: dietary analysis       Chyna Hodge M.A., [address] [facility] [address]

## 2020-08-18 NOTE — FLOWSHEET NOTE
[address]  PHYSICAL THERAPY MISSED TREATMENT NOTE  [facility] ICU STEPDOWN TELEMETRY 4K    Date: 2020  Patient Name: Jenny Lange        MRN: [id number]   : 1930  (80 y.o.)  Gender: male                REASON FOR MISSED TREATMENT:  Missed Treat. Pt with blood loss and anemia, RN received message from hospitalist stating to discontinue PT as pt to cont on strict bedrest and will reconsult when appropriate.

## 2020-08-18 NOTE — CONSULTS
CATHETERIZATION  09/07/2007    CARDIOVASCULAR STRESS TEST  09/27/2019    CARDIOVASCULAR STRESS TEST  05/27/2016    CARDIOVASCULAR STRESS TEST  01/07/2010    CATARACT REMOVAL Right 08/24/2009    CATARACT REMOVAL Left 09/21/2009    COLONOSCOPY  10/28/2009    COLONOSCOPY  02/2002    tranverse polyp removed    CORONARY ARTERY BYPASS GRAFT  09/2007    triple bypass    DOPPLER ECHOCARDIOGRAPHY  07/11/2014    DOPPLER ECHOCARDIOGRAPHY  02/2008    post CABG- EF 50%    EKG 12 LEAD (HISTORICAL)  07/11/2014    INGUINAL HERNIA REPAIR      PACEMAKER INSERTION  04/16/2014    Defib placed at this time   [address]  12/09/2008    pacemaker insertion    SKIN CANCER EXCISION  01/28/2010    Lesion removed from both ears & left cheek     Allergies:  Zestril [lisinopril]  Social History:  Social History     Socioeconomic History    Marital status: Single     Spouse name: Not on file    Number of children: Not on file    Years of education: Not on file    Highest education level: Not on file   Occupational History    Not on file   Social Needs    Financial resource strain: Not on file    Food insecurity     Worry: Not on file     Inability: Not on file    Transportation needs     Medical: Not on file     Non-medical: Not on file   Tobacco Use    Smoking status: Never Smoker    Smokeless tobacco: Never Used   Substance and Sexual Activity    Alcohol use: Never     Frequency: Never    Drug use: Never    Sexual activity: Never   Lifestyle    Physical activity     Days per week: Not on file     Minutes per session: Not on file    Stress: Not on file   Relationships    Social connections     Talks on phone: Not on file     Gets together: Not on file     Attends [employer] service: Not on file     Active member of club or organization: Not on file     Attends meetings of clubs or organizations: Not on file     Relationship status: Not on file    Intimate partner violence     Fear of current or ex partner: Not on file     Emotionally abused: Not on file     Physically abused: Not on file     Forced sexual activity: Not on file   Other Topics Concern    Not on file   Social History Narrative    Not on file     Family History:   No family history on file. ROS:  Constitutional: Negative for chills, fatigue, fever, or weight loss. Eyes: Denies reported visual changes. ENT: Denies headache, difficulty swallowing, earache, and nosebleeds. Cardiovascular: Negative for chest pain, palpitations, tachycardia or edema. Respiratory: Denies cough or SOB. GI:The patient denies abdominal or flank pain, anorexia, nausea or vomiting. : see HPI. Musculoskeletal: Patient denies low back pain or painful or reduced range of ROM. Neurological: The patient denies any symptoms of neurological impairment or TIA. Psychiatric: Denies anxiety or depression. Skin: Denies rash or lesions. All remaining ROS negative. PHYSICAL EXAM:  VITALS:  /65   Pulse 80   Temp 98.7 °F (37.1 °C) (Oral)   Resp 21   Ht 6' (1.829 m)   Wt 231 lb (104.8 kg)   SpO2 95%   BMI 31.33 kg/m² . Nursing note and vitals reviewed. Constitutional: Slight confusion, no acute distress, and cooperative to examination with appropriate mood and affect. HEENT:   Head:         Normocephalic and atraumatic. Mouth/Throat:          Mucous membranes are normal.   Eyes:         EOM are normal. No scleral icterus. Nose:    The external appearance of the nose is normal  Ears: The ears appear normal to external inspection. Cardiovascular:       Normal rate, regular rhythm. Pulmonary/Chest:  Normal respiratory rate and rhthym. No use of accessory muscles. Lungs clear bilaterally. Abdominal:          Soft. No tenderness. Active bowel sounds             Genitalia:    Hypospadiac circumcised penis, with alejandro draining clear, yellow urine  Musculoskeletal:    Normal range of motion. He exhibits no edema or tenderness of lower extremities. Extremities:    No cyanosis, clubbing, or edema present. Neurological:    Alert and orientedX2     DATA:  CBC:   Lab Results   Component Value Date    WBC 9.1 08/15/2020    RBC 2.23 08/15/2020    HGB 8.6 08/18/2020    HCT 26.2 08/18/2020    .6 08/15/2020    MCH 32.7 08/15/2020    MCHC 31.6 08/15/2020     08/15/2020    MPV 12.6 08/15/2020     BMP:    Lab Results   Component Value Date     08/18/2020    K 4.2 08/18/2020    K 4.8 08/15/2020     08/18/2020    CO2 20 08/18/2020    BUN 59 08/18/2020    CREATININE 3.2 08/18/2020    CALCIUM 8.6 08/18/2020    LABGLOM 18 08/18/2020    GLUCOSE 61 08/18/2020     BUN/Creatinine:    Lab Results   Component Value Date    BUN 59 08/18/2020    CREATININE 3.2 08/18/2020     Magnesium:  No results found for: MG  Phosphorus:  No results found for: PHOS  PT/INR:    Lab Results   Component Value Date    INR 1.42 08/15/2020     U/A:  No results found for: NITRITE, COLORU, PHUR, LABCAST, [address], RBCUA, MUCUS, TRICHOMONAS, YEAST, BACTERIA, CLARITYU, SPECGRAV, LEUKOCYTESUR, [address], [address] [place] [address], BLOODU, GLUCOSEU, AMORPHOUS    Imaging: The patient has had a CT Without  Contrast which I have independently reviewed along with its accompanying report. The study demonstrates   Narrative    PROCEDURE: CT ABDOMEN PELVIS WO CONTRAST         CLINICAL INFORMATION: follow up retroperitoneal hematoma, compare to initial CT to ensure no active bleeding .         COMPARISON: CT abdomen and pelvis 8/14/2020.         TECHNIQUE: Axial 5 mm CT images were obtained through the abdomen and pelvis. No contrast was given. Coronal and sagittal reconstructions were obtained.         All CT scans at this facility use dose modulation, iterative reconstruction, and/or weight-based dosing when appropriate to reduce radiation dose to as low as reasonably achievable.         FINDINGS:              There are moderate-sized layering bilateral pleural effusions.  These have increased in size. There is compressive atelectasis dependently of the lower lobes bilaterally. Pacemaker wires are seen in the heart.  The patient has had a prior median    sternotomy and CABG.         The liver is grossly normal. The spleen is normal. There is some stable free fluid adjacent to the spleen.         The gallbladder adrenal glands and pancreas are within acceptable limits.         There is a high attenuation collection in the retroperitoneum on the right this is inferior and medial to the right kidney. This extends into the pelvis at the level the pelvic rim. This is decreased in size. This is consistent with a retroperitoneal    hematoma. On the axial images at its largest it measures 9.7 x 5.6 cm. At the same location on the prior examination, this measured 10 x 7.6 cm.         There is improving surrounding fat stranding in the mesentery. There is improving fat stranding anterior to the inferior vena cava and abdominal aorta.         There is a component of the hematoma which surrounds the right kidney deep to the renal fascia. This could've been the source of the hemorrhage. There is thinning of the renal cortex bilaterally. This is relatively symmetric. There is no hydronephrosis    on either side. No underlying mass lesion is noted on the right. This assessment is limited due to the lack of IV contrast. There is no contour deforming renal lesion on the left.         The small bowel loops are relatively decompressed. There is no free air. The IVC and aorta are of normal caliber.  There is calcified atherosclerosis of the aorta. There is no adenopathy.              The urinary bladder is collapsed around a Solis catheter. There is a small amount of pelvic free fluid. There are surgical clips in the pelvis  there is diverticulosis of the colon. There is no evidence of diverticulitis. There is diffuse anasarca. There     is osseous demineralization.  There is an old compression fracture of L1.  No suspicious osseous lesions are present.                        Impression         1. Large right-sided retroperitoneal hematoma. This has decreased in size compared to examination from 3 days ago. 2. There is a component which surrounds, and possibly compresses, the right kidney. Question with patient's creatinine is recommended.  No underlying mass lesion is identified on this noncontrast exam.    3. Enlarging moderate-sized bilateral pleural effusions with bibasilar compressive atelectasis.               IMPRESSION:   Fall  CALVIN on CKD  Traumatic retroperitoneal hematoma  Hematoma of right kidney  Acute blood loss anemia    Plan:   Reviewed images with Dr Lianne Soto, no difference between films - no hydronephrosis on right kidney  Will treat conservatively, no acute intervention planned by urology  Urology will watch creatinine closely - currently 3.2 up from 2.3  Continue to monitor H&H - 8.6 currently  Transfuse as needed      Thank you for including us in the care of Franc KoMENDEL  08/18/20 4:18 PM  Urology

## 2020-08-19 LAB
ANION GAP SERPL CALCULATED.3IONS-SCNC: 14 MEQ/L (ref 8–16)
BUN BLDV-MCNC: 61 MG/DL (ref 7–22)
CALCIUM SERPL-MCNC: 8.6 MG/DL (ref 8.5–10.5)
CHLORIDE BLD-SCNC: 107 MEQ/L (ref 98–111)
CO2: 17 MEQ/L (ref 23–33)
CREAT SERPL-MCNC: 3 MG/DL (ref 0.4–1.2)
GFR SERPL CREATININE-BSD FRML MDRD: 20 ML/MIN/1.73M2
GLUCOSE BLD-MCNC: 101 MG/DL (ref 70–108)
HCT VFR BLD CALC: 26.3 % (ref 42–52)
HCT VFR BLD CALC: 26.9 % (ref 42–52)
HCT VFR BLD CALC: 26.9 % (ref 42–52)
HEMOGLOBIN: 8.2 GM/DL (ref 14–18)
HEMOGLOBIN: 8.2 GM/DL (ref 14–18)
HEMOGLOBIN: 8.5 GM/DL (ref 14–18)
POTASSIUM SERPL-SCNC: 4.5 MEQ/L (ref 3.5–5.2)
SODIUM BLD-SCNC: 138 MEQ/L (ref 135–145)

## 2020-08-19 PROCEDURE — 99233 SBSQ HOSP IP/OBS HIGH 50: CPT | Performed by: UROLOGY

## 2020-08-19 PROCEDURE — 97110 THERAPEUTIC EXERCISES: CPT

## 2020-08-19 PROCEDURE — 36415 COLL VENOUS BLD VENIPUNCTURE: CPT

## 2020-08-19 PROCEDURE — 6370000000 HC RX 637 (ALT 250 FOR IP): Performed by: INTERNAL MEDICINE

## 2020-08-19 PROCEDURE — 6370000000 HC RX 637 (ALT 250 FOR IP): Performed by: NURSE PRACTITIONER

## 2020-08-19 PROCEDURE — 2580000003 HC RX 258: Performed by: PHYSICIAN ASSISTANT

## 2020-08-19 PROCEDURE — 97162 PT EVAL MOD COMPLEX 30 MIN: CPT

## 2020-08-19 PROCEDURE — 2060000000 HC ICU INTERMEDIATE R&B

## 2020-08-19 PROCEDURE — 85014 HEMATOCRIT: CPT

## 2020-08-19 PROCEDURE — 80048 BASIC METABOLIC PNL TOTAL CA: CPT

## 2020-08-19 PROCEDURE — 85018 HEMOGLOBIN: CPT

## 2020-08-19 PROCEDURE — APPSS60 APP SPLIT SHARED TIME 46-60 MINUTES: Performed by: PHYSICIAN ASSISTANT

## 2020-08-19 PROCEDURE — 99232 SBSQ HOSP IP/OBS MODERATE 35: CPT | Performed by: SURGERY

## 2020-08-19 PROCEDURE — 97530 THERAPEUTIC ACTIVITIES: CPT

## 2020-08-19 PROCEDURE — 99232 SBSQ HOSP IP/OBS MODERATE 35: CPT | Performed by: INTERNAL MEDICINE

## 2020-08-19 RX ORDER — BUMETANIDE 1 MG/1
1 TABLET ORAL DAILY
Status: DISCONTINUED | OUTPATIENT
Start: 2020-08-19 | End: 2020-08-21

## 2020-08-19 RX ORDER — SODIUM BICARBONATE 650 MG/1
1300 TABLET ORAL 2 TIMES DAILY
Status: DISCONTINUED | OUTPATIENT
Start: 2020-08-19 | End: 2020-08-21 | Stop reason: HOSPADM

## 2020-08-19 RX ORDER — MIDODRINE HYDROCHLORIDE 2.5 MG/1
2.5 TABLET ORAL
Status: DISCONTINUED | OUTPATIENT
Start: 2020-08-19 | End: 2020-08-20

## 2020-08-19 RX ADMIN — ALLOPURINOL 100 MG: 100 TABLET ORAL at 08:33

## 2020-08-19 RX ADMIN — DOCUSATE SODIUM 100 MG: 100 CAPSULE, LIQUID FILLED ORAL at 08:33

## 2020-08-19 RX ADMIN — Medication 10 ML: at 20:31

## 2020-08-19 RX ADMIN — PANTOPRAZOLE SODIUM 40 MG: 40 TABLET, DELAYED RELEASE ORAL at 06:13

## 2020-08-19 RX ADMIN — LEVOTHYROXINE SODIUM 100 MCG: 100 TABLET ORAL at 06:13

## 2020-08-19 RX ADMIN — Medication 10 ML: at 08:35

## 2020-08-19 RX ADMIN — BUMETANIDE 1 MG: 1 TABLET ORAL at 16:00

## 2020-08-19 RX ADMIN — METOPROLOL TARTRATE 25 MG: 25 TABLET, FILM COATED ORAL at 20:31

## 2020-08-19 RX ADMIN — SODIUM BICARBONATE 1300 MG: 650 TABLET ORAL at 20:31

## 2020-08-19 RX ADMIN — ATORVASTATIN CALCIUM 20 MG: 20 TABLET, FILM COATED ORAL at 08:33

## 2020-08-19 RX ADMIN — MIDODRINE HYDROCHLORIDE 2.5 MG: 2.5 TABLET ORAL at 16:00

## 2020-08-19 RX ADMIN — AMIODARONE HYDROCHLORIDE 400 MG: 200 TABLET ORAL at 08:33

## 2020-08-19 RX ADMIN — SODIUM BICARBONATE 1300 MG: 650 TABLET ORAL at 16:00

## 2020-08-19 RX ADMIN — METOPROLOL TARTRATE 25 MG: 25 TABLET, FILM COATED ORAL at 08:33

## 2020-08-19 ASSESSMENT — PAIN SCALES - GENERAL
PAINLEVEL_OUTOF10: 0

## 2020-08-19 NOTE — PLAN OF CARE
Problem: Respiratory  Goal: O2 Sat > 90%  Outcome: Ongoing  Note: On room air. Problem: GI  Goal: No bowel complications  Outcome: Ongoing  Note: No BM thus far, passing gas. Problem: Nutrition  Goal: Optimal nutrition therapy  Outcome: Ongoing  Note: Clear liquid diet. Tolerating well. Problem: Skin Integrity/Risk  Goal: No skin breakdown during hospitalization  Outcome: Ongoing  Note: No new skin issues noted. Turning every 2 hours with pillow support. Problem: Musculor/Skeletal Functional Status  Goal: Absence of falls  Outcome: Ongoing  Note: No falls this shift. PT/OT eval and treat in place. Problem: Urinary Elimination:  Goal: Signs and symptoms of infection will decrease  Description: Signs and symptoms of infection will decrease  Outcome: Ongoing  Note: Catheter in place. Afebrile this shift. Problem: Bleeding:  Goal: Will show no signs and symptoms of excessive bleeding  Description: Will show no signs and symptoms of excessive bleeding  Outcome: Ongoing  Note: No signs of bleeding at this time. Problem: Cardiovascular  Goal: No DVT, peripheral vascular complications  Note: No signs of DVT noted. SCDS in place. Care plan reviewed with patient. Patient verbalizes understanding of the plan of care and contribute to goal setting.

## 2020-08-19 NOTE — PROGRESS NOTES
Hospitalist Progress Note      Patient:  Ana Maria Childress    Unit/Bed:4K-23/023-A  YOB: 1930  MRN: [id number]   Acct: [de-identified]   PCP: Isamar Aguilar  Date of Admission: 8/14/2020    Assessment/Plan:    1. CALVIN on chronic kidney disease stage IV: likely multifactorial; initially pre-renal d/t hemorrhage and hypotension. Pt s/o IVF and transfusion w/ no recovery; Hx of HFrEF per TTE from June 2020. Possible cardiorenal now? Solis in place 600 cc/24 hrs. FeNa from yesterday c/w pre-renal etiology vs cardiorenal w/ decreased ECV? Off IVF now. Will give lasix 40 mg IV once and reassess response. nephro also following  8/17: Cr 3.1 from 2.9 yesterday; urine lytes pending; U/O 800 cc /last 24 hr. Will hold off on lasix for now. On R/A w/o wheezing. BP well-controlled. Will trend   8/18: Cr down to 3.0 improved w/ hydration. CCM. Respiratory status remains stable    8/19-creatinine improved to 3.0 unchanged from yesterday      2. Hypotension due to blood loss anemia. stable on midodrine 5 TID, CCM  3. Acute blood loss anemia from retroperitoneal bleed/Perinephric hematoma: Hb stable at 7. 2. will trend closely. HD stable; pt denies symptoms. ASA/VKA held  8/17: repeat Hb 6.9 today. No clinically evident bleed. Asymptomatic. Will give PRBC x1U and recheck Hb post-Tx   8/18: Hb 8.1 post-Tx, Hd stable. No clinically evident bleed. Will monitor  8/19-hemoglobin around 8.5-8.6 hemoglobin has been stable so far no need for any blood transfusion today decrease hemoglobin hematocrit check every 12 hours rather than every 6 hours CBC a.m.    4. Hx of chronic Atrial fibrillation w/ Supratherapeutic INR POA: CVR on BB/amiodarone; off [address] s/p reversal w/ INR 1.42. given age and hx of fall, consider keeping permanently off [address]   5. Hx of hypothyroidism: on levothyroxine, TSH sent  6.  Hx of HFrEF w/ query acute decompensation 2/2 volume overload: on BB, managing as discussed above  8/18: clinically not volume-overload; remains on R/A; will continue keeping off diuretic  7. Hx of CAD: on statin/BB. Off ASA  8. Code status: DNR-CCA, palliative to see re: code status and also goals and philosophy of care  9. Obersity w/ BMI 30.2: Counseling offered   10. PT/OT to follow    Disposition plan-per nephrology  PLANNING FOR REHAB PER PRIMARY  MEDICINE WILL SIGN OFF  Please call PRN    Chief Complaint: fall/polytrauma    Initial H and P:-    Admitted under ICU/trauma team on 8/14/2020 for hypotension s/p fall w/ retroperitoneal bleed. Noted nephro also following. Pt was transferred to   overnight. Subjective (past 24 hours):    No issues overnight; sleeping comfortably lying flat; Denies SOB/CP/cough/palpitation/fever/chills  No other overnight events      Past medical history, family history, social history and allergies reviewed again and is unchanged since admission. ROS (12 point review of systems completed. Pertinent positives noted.  Otherwise ROS is negative)     Medications:  Reviewed    Infusion Medications     Scheduled Medications    pantoprazole  40 mg Oral QAM AC    sodium chloride  20 mL Intravenous Once    midodrine  5 mg Oral TID WC    sodium chloride flush  10 mL Intravenous 2 times per day    allopurinol  100 mg Oral Daily    amiodarone  400 mg Oral Daily    atorvastatin  20 mg Oral Daily    docusate sodium  100 mg Oral Daily    levothyroxine  100 mcg Oral Daily    metoprolol tartrate  25 mg Oral BID     PRN Meds: zolpidem, albuterol, sodium chloride flush, acetaminophen, polyethylene glycol, promethazine **OR** ondansetron, fentanNYL      Intake/Output Summary (Last 24 hours) at 8/19/2020 1124  Last data filed at 8/19/2020 1485  Gross per 24 hour   Intake 2397.99 ml   Output 750 ml   Net 1647.99 ml       Diet:  DIET GENERAL;    Exam:  /64   Pulse 80   Temp 97.5 °F (36.4 °C) (Axillary)   Resp 19   Ht 6' (1.829 m)   Wt 231 lb (104.8 kg) SpO2 95%   BMI 31.33 kg/m²   General appearance: Obese, No apparent distress, appears stated age and cooperative. HEENT: Pupils equal, round, and reactive to light. Conjunctivae/corneas clear. Neck: Supple, with full range of motion. No jugular venous distention. Trachea midline. Respiratory: Audible wheezes resolved, decreased A/E at bases bilat  Cardiovascular: Regular rate and rhythm with normal S1/S2 without murmurs, rubs or gallops. Abdomen: Soft, non-tender, non-distended with normal bowel sounds. Musculoskeletal: passive and active ROM x 4 extremities. Skin: Skin color, texture, turgor normal.  No rashes; trace edema bilat. Neurologic:  Neurovascularly intact without any focal sensory/motor deficits. Cranial nerves: II-XII intact, grossly non-focal.  Psychiatric: Alert and oriented, thought content appropriate, normal insight  Capillary Refill: Brisk,< 3 seconds   Peripheral Pulses: +2 palpable, equal bilaterally     Labs:   Recent Labs     08/18/20  1842 08/19/20  0059 08/19/20  0558   HGB 8.4* 8.2* 8.2*   HCT 26.3* 26.3* 26.9*     Recent Labs     08/18/20  1014 08/18/20  1842 08/19/20  0059    137 138   K 4.2 4.3 4.5    105 107   CO2 20* 18* 17*   BUN 59* 60* 61*   CREATININE 3.2* 2.9* 3.0*   CALCIUM 8.6 8.5 8.6     No results for input(s): AST, ALT, BILIDIR, BILITOT, ALKPHOS in the last 72 hours. No results for input(s): INR in the last 72 hours. No results for input(s): Thelda Rough in the last 72 hours.     Microbiology:    Blood culture #1: No results found for: BC    Blood culture #2:No results found for: Janine Jordan    Organism:No results found for: ORG    No results found for: LABGRAM    MRSA culture only:No results found for: [address] [place] [address]    Urine culture:   Lab Results   Component Value Date    LABURIN No growth-preliminary No growth  08/14/2020       Respiratory culture: No results found for: CULTRESP    Aerobic and Anaerobic :  No results found for: LABAERO  No results found for: LABANAE    Urinalysis:    No results found for: Marlyce Scarce, BACTERIA, RBCUA, BLOODU, SPECGRAV, Stella [place] [address]    Radiology:  CT ABDOMEN PELVIS WO CONTRAST   Final Result       1. Large right-sided retroperitoneal hematoma. This has decreased in size compared to examination from 3 days ago. 2. There is a component which surrounds, and possibly compresses, the right kidney. Question with patient's creatinine is recommended. No underlying mass lesion is identified on this noncontrast exam.   3. Enlarging moderate-sized bilateral pleural effusions with bibasilar compressive atelectasis. **This report has been created using voice recognition software. It may contain minor errors which are inherent in voice recognition technology. **      Final report electronically signed by Dr. Sahra Levy on 8/18/2020 10:56 AM      XR CHEST PORTABLE   Final Result      Stable postoperative sternotomy and cardiac enlargement with visualized pacemaker. Increasing interstitial edema correlate with slight progression of heart failure. Bibasilar atelectasis and effusions persist.   **This report has been created using voice recognition software. It may contain minor errors which are inherent in voice recognition technology. **      Final report electronically signed by Dr. Fausto Baker on 8/17/2020 3:16 AM      XR CHEST PORTABLE   Final Result   1. The lung volumes are slightly diminished. There is slightly worsened opacity at the right lung base which most commonly represents a small right pleural effusion and atelectasis/infiltrate. The lateral aspect of the left lower chest is partially    disturbed by the overlying generator device. Given this limitation, there is slightly worsened opacity within the left lower chest with the differential including pleural fluid and atelectasis/infiltrates. There is no pulmonary vascular congestion. Follow-up chest radiographs recommended to confirm complete resolution. may be of secured resected. There are a few mildly dilated loops of small bowel and small air-fluid levels within a few loops of small bowel and also within    the transverse colon. Findings suggest an ileus. 4. Numerous additional findings as described in the body of the report. 5. Correlation should be made with the report from [facility] to exclude additional findings. This report was not available at the time of interpretation. **This report has been created using voice recognition software. It may contain minor errors which are inherent in voice recognition technology. **      Final report electronically signed by Dr. Bruce Pereira on 8/15/2020 10:03 AM      XR COMPARISON OF OUTSIDE FILMS   Final Result        Us Renal Complete    Result Date: 8/15/2020  PROCEDURE: US RENAL COMPLETE CLINICAL INFORMATION: fatoumata on ckd. COMPARISON: No prior study. TECHNIQUE: Grayscale and color images were obtained of both kidneys. FINDINGS: Limited exam detail secondary to suboptimal acoustic window. RIGHT KIDNEY - 8.7 x 5.3 x 4.8 cm Resistive Index - 0.67 Cortical Thickness - 1.1 cm. There is a perinephric heterogeneous fluid collection that measures 7.1 x 7.5 x 2.4 cm with slightly heterogeneous appearance. This suggests the perinephric hematoma previously described per history. There is no visualized obstructive uropathy. LEFT KIDNEY - 9.4 x 5.5 x 4.6 cm Resistive Index - 0.64 Cortical Thickness - 1.2 cm There is normal echogenicity of the renal parenchyma bilaterally. There is no thinning of the renal cortex. There is no hydronephrosis. No stones are noted. The urinary bladder is decompressed by a Solis catheter the detail is limited. There is noted right pleural effusion partially visualized. There is free fluid in the left upper quadrant, correlate with ascites versus hematoma. 1. Heterogeneous right perinephric collection correlate with perinephric hematoma. 2. No obstructive uropathy present. 3. Right pleural effusion present. 4. Left upper quadrant free fluid present. **This report has been created using voice recognition software. It may contain minor errors which are inherent in voice recognition technology. ** Final report electronically signed by Dr. Nadine Castañeda on 8/15/2020 6:33 AM    Ct Interpretation Of Outside Images    Result Date: 8/15/2020  PROCEDURE: CT INTERPRETATION OF OUTSIDE IMAGES CLINICAL INFORMATION: Trauma transfer. COMPARISON: No prior study. TECHNIQUE: Interpretation of an outside CT examination of the abdomen/pelvis without contrast from [facility]. FINDINGS: LIMITATIONS: 1. The lack of intravenous contrast limits evaluation of the solid organs. LUNG BASES: 1. There are bilateral pleural effusions, small to moderate on the left and small on the right with associated consolidation within the lower chest bilaterally. 2. There is a subcentimeter noncalcified nodule within the right lower chest. 3. There is mild cardiomegaly. 4. There is a pacemaker/AICD device. 5. There is a small amount of gas along the anterior aspect of the heart to the right of midline. There is no pneumothorax. 6. Coronary artery calcifications are present however not quantified. There is also calcification along the aortic valve. LIVER/GALLBLADDER/BILIARY TREE: Unremarkable. PANCREAS/SPLEEN: 1. There is fatty infiltration of the pancreas which is otherwise unremarkable. 2. The unopacified spleen is unremarkable. ADRENAL GLANDS/KIDNEYS: 1. The bilateral adrenal glands are unremarkable. 2. The left kidney is atrophic. 3. There is enlargement of the right kidney with mixed attenuation throughout the right kidney. The high attenuation seen more peripherally suggesting a subcapsular hematoma. There is also a large amount of hemorrhage within the right perinephric space, right posterior perirenal space and down within the right abdomen/retroperitoneum with extension inferiorly into the right pelvis.  The largest component of the hematoma is within the right abdomen/retroperitoneum tracking inferiorly into the right pelvis  measuring 14.5 x 9.6 x 7.3 cm in longitudinal, transverse and AP dimensions. There is also hemorrhage tracking medially within the abdomen and proximal pelvis. In addition, there is fluid suggesting hemorrhage adjacent to the spleen and tracking inferiorly along the left colonic gutter. As previously noted no obvious splenic laceration is identified on the noncontrasted imaging. BOWEL: 1. The bowel gas pattern is nonobstructive. The ascending colon is not seen in its entirety and may be of secured resected. There are a few mildly dilated loops of small bowel and small air-fluid levels within a few loops of small bowel and also within the transverse colon. Findings suggest an ileus. FREE AIR/FREE FLUID/INFLAMMATION: 1. As previously described. 2. There is no free air. LYMPHADENOPATHY: 1. No pathologically enlarged lymph nodes. ABDOMINAL AORTA: 1. Atheromatous calcification throughout the abdominal aorta, iliac, common femoral, superficial femoral and profunda femoral arteries. 2. There is atheromatous calcification within the celiac trunk and superior mesenteric artery. 3. There is ectasia of the right common iliac artery. ABDOMINAL WALL: 1. There are stranding densities within the subcutaneous tissues suggesting edema which may be related to 3rd spacing. 2. There is gynecomastia bilaterally. MUSCULOSKELETAL: 1. The bony structures are osteopenic. 2. There is levoscoliosis of the lumbar spine. There is 0.7 cm anterolisthesis of L4 on L5. There is a compression fracture of the L1 vertebral body with approximately 30-40% loss of superior height which most likely is chronic. 3. Multilevel degenerative changes. 4. There are syndesmophytes which can be associated with ankylosing spondylitis. OTHER: None. 1. There is enlargement of the right kidney with mixed attenuation throughout the right kidney.  The high attenuation seen more peripherally suggesting a subcapsular hematoma. There is also a large amount of hemorrhage within the right perinephric space, right posterior perirenal space and down within the right abdomen/retroperitoneum with extension inferiorly into the right pelvis. The largest component of the hematoma is within the right abdomen/retroperitoneum tracking inferiorly into the right pelvis  measuring 14.5 x 9.6 x 7.3 cm in longitudinal, transverse and AP dimensions. There is also hemorrhage tracking medially within the abdomen and proximal pelvis. In addition, there is fluid suggesting hemorrhage adjacent to the spleen and tracking inferiorly along the left colonic gutter. As previously noted no obvious splenic laceration is identified on the noncontrasted imaging. 2. There are bilateral pleural effusions, small to moderate on the left and small on the right with associated consolidation within the lower chest bilaterally. 3.  The bowel gas pattern is nonobstructive. The ascending colon is not seen in its entirety and may be of secured resected. There are a few mildly dilated loops of small bowel and small air-fluid levels within a few loops of small bowel and also within the transverse colon. Findings suggest an ileus. 4. Numerous additional findings as described in the body of the report. 5. Correlation should be made with the report from [facility] to exclude additional findings. This report was not available at the time of interpretation. **This report has been created using voice recognition software. It may contain minor errors which are inherent in voice recognition technology. ** Final report electronically signed by Dr. Osmar Walters on 8/15/2020 10:03 AM    Xr Comparison Of Outside Films    Result Date: 8/15/2020  Radiology exam is complete. No Radiologist dictation. Please follow up with ordering provider.      With RN in room, patient was updated about and agreed upon the treatment plan, all the questions and concerns were addressed.       Electronically signed by Aneudy Evans MD on 8/19/2020 at 11:24 AM

## 2020-08-19 NOTE — PROGRESS NOTES
[address] STEPDOWN TELEMETRY 4K  [address] [facility] [address]  Dept: [phone]  Loc: [phone]  Visit Date: 2020  Urology Progress Note    Chief Complaint: trauma from fall    Subjective: \"  Patient is resting in bed, voiding voiding clear, yellow urine via alejandro catheter, +flatus, +BM, up with assitance, tolerating regular diet, denies any nausea or vomiting. There are complaints of no pain at this time.         Vitals:  /67   Pulse 80   Temp 97.9 °F (36.6 °C) (Oral)   Resp 22   Ht 6' (1.829 m)   Wt 231 lb (104.8 kg)   SpO2 97%   BMI 31.33 kg/m²   Temp  Av.9 °F (36.6 °C)  Min: 97.3 °F (36.3 °C)  Max: 98.7 °F (37.1 °C)    Intake/Output Summary (Last 24 hours) at 2020 1106  Last data filed at 2020 4960  Gross per 24 hour   Intake 2397.99 ml   Output 750 ml   Net 1647.99 ml       Social History     Socioeconomic History    Marital status: Single     Spouse name: Not on file    Number of children: Not on file    Years of education: Not on file    Highest education level: Not on file   Occupational History    Not on file   Social Needs    Financial resource strain: Not on file    Food insecurity     Worry: Not on file     Inability: Not on file   [employer] needs     Medical: Not on file     Non-medical: Not on file   Tobacco Use    Smoking status: Never Smoker    Smokeless tobacco: Never Used   Substance and Sexual Activity    Alcohol use: Never     Frequency: Never    Drug use: Never    Sexual activity: Never   Lifestyle    Physical activity     Days per week: Not on file     Minutes per session: Not on file    Stress: Not on file   Relationships    Social connections     Talks on phone: Not on file     Gets together: Not on file     Attends Latter-day service: Not on file     Active member of club or organization: Not on file     Attends meetings of clubs or organizations: Not on file     Relationship status: Not on file    Intimate partner violence     Fear of current or ex partner: Not on file     Emotionally abused: Not on file     Physically abused: Not on file     Forced sexual activity: Not on file   Other Topics Concern    Not on file   Social History Narrative    Not on file     No family history on file. Allergies   Allergen Reactions    Zestril [Lisinopril] Other (See Comments)     cough       Objective:    Constitutional: Alert and oriented times x2, no acute distress, and cooperative to examination with appropriate mood and affect. HEENT:   Head:         Normocephalic and atraumatic. Mucous membranes are normal.   Eyes:         EOM are normal. No scleral icterus. Nose:    The external appearance of the nose is normal  Ears: The ears appear normal to external inspection. Cardiovascular:       Normal rate, regular rhythm. Pulmonary/Chest:  Normal respiratory rate and rhthym. No use of accessory muscles. Lungs clear bilaterally. Abdominal:          Soft. No tenderness. Active bowel sounds. Genitalia:    Solis catheter draining clear,yellow urine  Musculoskeletal:    Normal range of motion. He exhibits no edema or tenderness of lower extremities. Extremities:    No cyanosis, clubbing, or edema present. Neurological:    Alert and oriented. Labs:  WBC:    Lab Results   Component Value Date    WBC 9.1 08/15/2020     Hemoglobin/Hematocrit:    Lab Results   Component Value Date    HGB 8.2 08/19/2020    HCT 26.9 08/19/2020     BMP:    Lab Results   Component Value Date     08/19/2020    K 4.5 08/19/2020    K 4.8 08/15/2020     08/19/2020    CO2 17 08/19/2020    BUN 61 08/19/2020    LABALBU 3.2 08/15/2020    CREATININE 3.0 08/19/2020    CALCIUM 8.6 08/19/2020    LABGLOM 20 08/19/2020       Impression:  Fall  CALVIN on CKD  Traumatic retroperitoneal hematoma  Hematoma of right kidney  Acute blood loss anemia        Plan:   Will treat conservatively, no acute intervention planned by urology  Urology will watch creatinine closely - currently 3.0 up from 2.3  Continue to monitor H&H - 8.2 currently  Transfuse as needed        MENDEL Lara  08/19/20 11:06 AM  Urology

## 2020-08-19 NOTE — PROGRESS NOTES
[address] [facility] THERAPY MISSED TREATMENT NOTE  [facility] ICU STEPDOWN TELEMETRY 4K  4K-23/023-A      Date: 2020  Patient Name: Jose Roberto Russ        CSN: [id number]   : 1930  (80 y.o.)  Gender: male                REASON FOR MISSED TREATMENT: OT attempted at this time, although pt had just recently finished with PT and was able to tolerate only minimal activity.  Will check back as able

## 2020-08-19 NOTE — PROGRESS NOTES
[address] [facility] PHYSICAL THERAPY  EVALUATION  STR ICU STEPDOWN TELEMETRY 4K - 4K-23/023-A    Time In: 1320  Time Out: 1356  Timed Code Treatment Minutes: 23 Minutes  Minutes: 36          Date: 2020  Patient Name: Tila Sanford,  Gender:  male        MRN: [id number]  : 1930  (80 y.o.)      Referring Practitioner: Kimberly Martin MD  Diagnosis: Retroperitoneal bleeding  Additional Pertinent Hx: Mr. Peng Umaña is a 79 Y/O male presenting at [address] [place] [address] by activation of level I trauma, brought by EMS transfer from [facility] ED following confirmed retroperitoneal bleeding identified on CT without active extravigation, past medical history includes CAD, Afib? On warfain. Per EMS report states INR over 3.0, had fallen 12 days prior, new onset right abdominal pain thismorning caused patient to go to ED where he became hypotensive and CT scan performed. Given Kcentra, vitamin K, 1 unit PRBC, and 3 L normal saline prior to arrival at [address] [place] [address]. Patient reports right lower abdominal pain worse with palpation, patient does not recall injury to right side of body in recent past. Patient denies neck pain, abdominal pain, SOB, chest pain, and nausea. Patient admitted to trauma services for close monitoring of hemoglobin and transfusions as necessary. Care in coordination with trauma surgeon Dr. Kimberly Martin. Restrictions/Precautions:  Restrictions/Precautions: Fall Risk    Subjective:  Chart Reviewed: Yes  Patient assessed for rehabilitation services?: Yes  Family / Caregiver Present: No  Subjective: RN approved session, pt is supine in bed, sleepy, confused, telesitter present. Pt agreeable to PT after slight encouragement, hemoglobin 8.2 this morning.     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 Pt returned to supine in bed, O2 sats 94-95% on room air, labored breathing. Bed Mobility:  Rolling to Left: Moderate Assistance   Rolling to Right: Moderate Assistance   Supine to Sit: Moderate Assistance  Sit to Supine: Moderate Assistance     Transfers:  Not safe to attempt at this time    Exercise:  Patient was guided in 1 set(s) 10 reps of exercise to both lower extremities. Ankle pumps, AAROM Heelslides and AAROM Hip abduction/adduction. Exercises were completed for increased independence with functional mobility. Functional Outcome Measures: Completed  AM-PAC Inpatient Mobility without Stair Climbing Raw Score : 7  AM-PAC Inpatient without Stair Climbing T-Scale Score : 28.66    ASSESSMENT:  Activity Tolerance:  Patient tolerance of  treatment: fair. Limited by SOB. Treatment Initiated: Treatment and education initiated within context of evaluation. Evaluation time included review of current medical information, gathering information related to past medical, social and functional history, completion of standardized testing, formal and informal observation of tasks, assessment of data and development of plan of care and goals.   Treatment time included skilled education and facilitation of tasks to increase safety and independence with functional mobility for improved independence and quality of life. See above exercises, EOB tolerance, additional bed mobility, positioning. Assessment: Body structures, Functions, Activity limitations: Decreased functional mobility , Decreased balance, Decreased strength, Decreased cognition, Decreased endurance  Assessment: Pt tolerates session fair, limited by impaired endurance, fatigues quickly, short of breath when EOB. PT to continue to progress strength and functional mobility. Prognosis: Good    REQUIRES PT FOLLOW UP: Yes    Discharge Recommendations:  Discharge Recommendations: [address] , 24 hour supervision or assist    Patient Education:  PT Education: PT Role, Plan of Care    Equipment Recommendations: Other: will continue to assess needs pending progress and DC destination, may need w/c    Plan:  Times per week: 3-5x GM  Current Treatment Recommendations: Strengthening, Safety Education & Training, Balance Training, Functional Mobility Training, Patient/Caregiver Education & Training    Goals:  Patient goals : to get better  Short term goals  Time Frame for Short term goals: by discharge  Short term goal 1: Pt to transfer supine <--> sit SBA to enable pt to get in/out of bed. Short term goal 2: PT to assess transfers/gait. Long term goals  Time Frame for Long term goals : NA due to short length of stay. Following session, patient left in safe position with all fall risk precautions in place.

## 2020-08-19 NOTE — PROGRESS NOTES
Lavonne Odom covering [facility]  Daily Progress Note    Pt Name: Stella Casanova  Medical Record Number: [id number]  Date of Birth 2/21/1930   Today's Date: 8/19/2020    HD: # 5    CC: \"Okay\"    Orion Barba Problems    Diagnosis Date Noted    Fall [W19. XXXA] 08/18/2020    Traumatic retroperitoneal hematoma [S36.892A] 08/18/2020    Hematoma of right kidney [S37.011A] 08/18/2020    Acute kidney injury superimposed on CKD ([facility] [address] [N17.9, N18.9] 08/18/2020    Hypotension [I95.9] 08/18/2020    Acute blood loss anemia [D62]     Retroperitoneal bleeding [R58] 08/14/2020       PLAN  Patient admitted under Trauma Services to the ICU with Tele   - Patient now on 4K     Retroperitoneal hematoma & subcapsular renal hematoma   - Repeat abdominal CT yesterday notes decreasing size of retroperitoneal hematoma but component that surrounds and possibly compresses the right kidney   - Urology consulted, plan to treat conservatively   - Monitor creatinine and hemoglobin closely              - Serial H&Hs continue - stable at 8.2 this AM   - Transfused 1 unit PRBCs 8/16, 1 unit PRBCs 8/17   - Transfuse as necessary, keep Hgb >7.0              - Vital signs monitoring              - Given Kcentra and vitamin K on admission for INR of 1.51              - TEG parameters within normal range on admission   - May increase activity    CALVIN on CKD   - Nephrology assisting with management   - Felt to be multifactorial from hypotension, blood loss, renal hematoma   - Nephrology stopping fluids and resuming home Bumex   - Metabolic acidosis and nephrology adding p.o. bicarb     Leukocytosis, resolved              - 10.9 on admission, 9.1 8/15   - Likely reactive   - Afebrile this admission     Acute blood loss anemia              - Serial H&H now remains above 8.0   - Transfused 1 unit PRBCs 8/16, 1 unit PRBCs 8/17   - Transfuse as necessary, keep Hgb >7.0    Hypotension - resolved   - Likely from blood loss   - Continues on Midodrine    Chronic atrial fibrillation, rate controlled   - Hold OAC for now   - Hospitalist assisting with management   - Continue beta blocker and Amiodarone    Hypothryoidism   - Continue Synthroid    HFrEF - not acutely decompensated   - Diuretics resuming today per nephro   - Continue beta blocker    CAD   - Continue Lipitor   - Hold Aspirin     Consults: Jasmyn Cantrell, Nephrology, Urology     Pain Management              - Fentanyl, Tylenol     Prophylaxis: SCD's, Incentive Spirometry, Colace, Protonix, Zofran     General diet     Gentle IVF Management  Regular Neurovascular Checks  Repeat Labs Tomorrow AM  PT/OT - can eval and treat now  Increase activity as tolerated     Planned Discharge pending clinical course. - From [facility] with plans to return at discharge       SUBJECTIVE  Patient seen on 4K this morning. He is asleep upon entering room. Awakens easily. Patient said he was doing okay this morning. Patient denied having any pain or complaints. Patient denied having any headaches, lightheadedness, dizziness, chest pain, shortness of breath, abdominal pain, nausea/vomiting, and diarrhea. Patient endorsed having a bowel movement yesterday. On exam patient is alert and oriented x3, in no acute distress, lying in hospital bed. Ecchymosis noted to bilateral flanks and bilateral chest walls. Ecchymosis worse on right. PMS intact in all 4 extremities. SCDs in place in bilateral lower extremities. Repeat hemoglobin was stable at 8.2 this morning. Bedrest discontinued and patient may increase activity. PT OT. Creatinine elevated at 3.0. Nephrology following. Urology consulted as repeat abdominal CT yesterday showed component of retroperitoneal hematoma that surrounds and possibly compresses right kidney. Overall hematoma has decreased in size compared to prior CT.   Urologyrecommending conservative treatment and no hours. Pancreas/HFP:  No results for input(s): LIPASE, AMYLASE in the last 72 hours. No results for input(s): AST, ALT, BILIDIR, BILITOT, ALKPHOS in the last 72 hours. RADIOLOGY:  Narrative    PROCEDURE: CT ABDOMEN PELVIS WO CONTRAST         CLINICAL INFORMATION: follow up retroperitoneal hematoma, compare to initial CT to ensure no active bleeding .         COMPARISON: CT abdomen and pelvis 8/14/2020.         TECHNIQUE: Axial 5 mm CT images were obtained through the abdomen and pelvis. No contrast was given. Coronal and sagittal reconstructions were obtained.         All CT scans at this facility use dose modulation, iterative reconstruction, and/or weight-based dosing when appropriate to reduce radiation dose to as low as reasonably achievable.         FINDINGS:              There are moderate-sized layering bilateral pleural effusions. These have increased in size. There is compressive atelectasis dependently of the lower lobes bilaterally. Pacemaker wires are seen in the heart.  The patient has had a prior median    sternotomy and CABG.         The liver is grossly normal. The spleen is normal. There is some stable free fluid adjacent to the spleen.         The gallbladder adrenal glands and pancreas are within acceptable limits.         There is a high attenuation collection in the retroperitoneum on the right this is inferior and medial to the right kidney. This extends into the pelvis at the level the pelvic rim. This is decreased in size. This is consistent with a retroperitoneal    hematoma. On the axial images at its largest it measures 9.7 x 5.6 cm. At the same location on the prior examination, this measured 10 x 7.6 cm.         There is improving surrounding fat stranding in the mesentery. There is improving fat stranding anterior to the inferior vena cava and abdominal aorta.         There is a component of the hematoma which surrounds the right kidney deep to the renal fascia.  This could've been the source of the hemorrhage. There is thinning of the renal cortex bilaterally. This is relatively symmetric. There is no hydronephrosis    on either side. No underlying mass lesion is noted on the right. This assessment is limited due to the lack of IV contrast. There is no contour deforming renal lesion on the left.         The small bowel loops are relatively decompressed. There is no free air. The IVC and aorta are of normal caliber.  There is calcified atherosclerosis of the aorta. There is no adenopathy.              The urinary bladder is collapsed around a Solis catheter. There is a small amount of pelvic free fluid. There are surgical clips in the pelvis  there is diverticulosis of the colon. There is no evidence of diverticulitis. There is diffuse anasarca. There     is osseous demineralization. There is an old compression fracture of L1.  No suspicious osseous lesions are present.                        Impression         1. Large right-sided retroperitoneal hematoma. This has decreased in size compared to examination from 3 days ago. 2. There is a component which surrounds, and possibly compresses, the right kidney. Question with patient's creatinine is recommended. No underlying mass lesion is identified on this noncontrast exam.    3. Enlarging moderate-sized bilateral pleural effusions with bibasilar compressive atelectasis.                     **This report has been created using voice recognition software. It may contain minor errors which are inherent in voice recognition technology. **         Final report electronically signed by Dr. Kar Velásquez on 8/18/2020 10:56 AM         Electronically signed by Tejal Rosales PA-C on 8/19/2020 at 8:20 AM     Patient seen and evaluated independently this a.m. Alert and appropriate answering questions appropriately. Hemoglobin stable he remains hemodynamically stable. No blood thinning medications. Can mobilize patient. Creatinine 3.   Nephrology is following. Conservative management per urology retroperitoneal hemorrhage is stable. Gentle hydration is being stopped and resume Bumex due to volume overload. Back to [facility]CF when all services feel patient is stable hopefully in the next 24 to 48 hours. Care coordinated with Evaristo Suárez PA-C. Agree as documented. All new data and imaging reviewed and patient examined.

## 2020-08-19 NOTE — PROGRESS NOTES
Renal Progress Note  [facility]    Patient :  Olivier Manley; 80 y.o. MRN# [id number]  Location:  -83/573-S  Attending:  Mahogany Casillas MD  Admit Date:  8/14/2020   Hospital Day: 5      Subjective:     Nephrology is following the patient for CALVIN on CKD. Seen and examined earlier this morning. Urology notes reviewed. No intervention. Hemoglobin is stable. Outpatient Medications:     Medications Prior to Admission: polyethylene glycol (GLYCOLAX) 17 g packet, Take 17 g by mouth daily  allopurinol (ZYLOPRIM) 100 MG tablet, Take 100 mg by mouth daily  atorvastatin (LIPITOR) 20 MG tablet, Take 20 mg by mouth daily  metoprolol tartrate (LOPRESSOR) 25 MG tablet, Take 25 mg by mouth 2 times daily Hold if SBP <100 or DBP<60  docusate sodium (COLACE) 100 MG capsule, Take 100 mg by mouth daily  levothyroxine (SYNTHROID) 100 MCG tablet, Take 100 mcg by mouth Daily  acetaminophen (TYLENOL) 500 MG tablet, Take 500 mg by mouth every 6 hours as needed for Pain  meclizine (ANTIVERT) 12.5 MG tablet, Take 12.5 mg by mouth 3 times daily as needed for Dizziness  warfarin (COUMADIN) 5 MG tablet, Take 5 mg by mouth daily  bumetanide (BUMEX) 2 MG tablet, Take 2 mg by mouth daily (with breakfast)   bumetanide (BUMEX) 1 MG tablet, Take 1 mg by mouth Daily with lunch  zolpidem (AMBIEN) 5 MG tablet, Take 5 mg by mouth nightly. potassium citrate (UROCIT-K) 10 MEQ (1080 MG) extended release tablet, Take 10 mEq by mouth daily  amiodarone (CORDARONE) 200 MG tablet, Take 400 mg by mouth daily  aspirin 81 MG EC tablet, Take 81 mg by mouth nightly  nitroGLYCERIN (NITROSTAT) 0.4 MG SL tablet, Place 0.4 mg under the tongue every 5 minutes as needed for Chest pain up to max of 3 total doses. If no relief after 1 dose, call 911.     Current Medications:     Scheduled Meds:    midodrine  2.5 mg Oral TID WC    pantoprazole  40 mg Oral QAM AC    sodium chloride  20 mL Intravenous Once    sodium chloride flush  10 mL Intravenous 2 times per day    allopurinol  100 mg Oral Daily    amiodarone  400 mg Oral Daily    atorvastatin  20 mg Oral Daily    docusate sodium  100 mg Oral Daily    levothyroxine  100 mcg Oral Daily    metoprolol tartrate  25 mg Oral BID     Continuous Infusions:     PRN Meds:  zolpidem, albuterol, sodium chloride flush, acetaminophen, polyethylene glycol, promethazine **OR** ondansetron, fentanNYL    Input/Output:       I/O last 3 completed shifts: In: 2378 [P. O.:660; I.V.:1718]  Out: 750 [Urine:750]. Patient Vitals for the past 96 hrs (Last 3 readings):   Weight   20 0317 231 lb (104.8 kg)   20 0342 223 lb 4.8 oz (101.3 kg)       Vital Signs:   Temperature:  Temp: 97.5 °F (36.4 °C)  TMax:   Temp (24hrs), Av.9 °F (36.6 °C), Min:97.3 °F (36.3 °C), Max:98.7 °F (37.1 °C)    Respirations:  Resp: 19  Pulse:   Pulse: 80  BP:    BP: 132/64  BP Range: Systolic (73PJY), SZB:613 , Min:122 , YYP:396       Diastolic (57FCR), JBW:68, Min:63, Max:70      Physical Examination:     General:  Awake, nontoxic, hard of hearing  HEENT: NC/AT/ MMM +JVD  Chest:               diminished  Cardiac:  irregular  Abdomen: Soft, non-tender,  Neuro:  No facial droop, No Asterixis  SKIN:  No rashes, good skin turgor. Extremities:  trace edema, no cyanosis    Labs:       Recent Labs     20  1842 20  0059 20  0558   HGB 8.4* 8.2* 8.2*   HCT 26.3* 26.3* 26.9*      BMP:   Recent Labs     20  1014 20  1842 20  0059    137 138   K 4.2 4.3 4.5    105 107   CO2 20* 18* 17*   BUN 59* 60* 61*   CREATININE 3.2* 2.9* 3.0*   GLUCOSE 61* 99 101   CALCIUM 8.6 8.5 8.6      Phosphorus:   No results for input(s): PHOS in the last 72 hours. Magnesium:  No results for input(s): MG in the last 72 hours. Albumin:    No results for input(s): LABALBU in the last 72 hours.   BNP:    No results found for: BNP  OLIVIA:    No results found for: OLIVIA  SPEP:  Lab Results   Component Value Date PROT 4.7 08/15/2020     UPEP:   No results found for: LABPE  C3:   No results found for: C3  C4:   No results found for: C4  MPO ANCA:   No results found for: MPO  PR3 ANCA:   No results found for: PR3  Anti-GBM:   No results found for: GBMABIGG  Hep BsAg:       No results found for: HEPBSAG  Hep C AB:        No results found for: HEPCAB    Urinalysis/Chemistries:    No results found for: NITRU, COLORU, PHUR, LABCAST, WBCUA, RBCUA, MUCUS, TRICHOMONAS, YEAST, BACTERIA, CLARITYU, SPECGRAV, LEUKOCYTESUR, UROBILINOGEN, BILIRUBINUR, BLOODU, GLUCOSEU, KETUA, AMORPHOUS  Urine Sodium:   No results found for: PALLAVI  Urine Potassium:  No results found for: KUR  Urine Chloride:  No results found for: CLUR  Urine Osmolarity: No results found for: OSMOU  Urine Protein:   No components found for: TOTALPROTEIN, URINE   Urine Creatinine:   No results found for: LABCREA  Urine Eosinophils:  No components found for: UEOS        Impression and Plan:  1. CALVIN on CKD :renal function overall worse from admission but has remained stable. Likely multifactorial from hypotension, blood loss, renal hematoma  -for now I will stop his fluids he is looking fluid overloaded and will resume his home bumex 1 mg daily    2. Hypotension on Midodrine, decrease to 2.5 mg TID  3. Acute blood loss anemia: requiring PRBC transfusions  4. Metabolic acidosis will add po bicarb  5. Subcapsular renal hematoma and retroperitoneal hematoma  6. Systolic CHF, EF 50-35%  7. Afib  8. CAD/CABG  9. S/p fall        Please don't hesitate to call with any questions.   Electronically signed by Bebo Clement DO on 8/19/2020 at 1:00 PM

## 2020-08-20 LAB
ANION GAP SERPL CALCULATED.3IONS-SCNC: 12 MEQ/L (ref 8–16)
BUN BLDV-MCNC: 61 MG/DL (ref 7–22)
CALCIUM SERPL-MCNC: 8.6 MG/DL (ref 8.5–10.5)
CHLORIDE BLD-SCNC: 110 MEQ/L (ref 98–111)
CO2: 19 MEQ/L (ref 23–33)
CREAT SERPL-MCNC: 2.9 MG/DL (ref 0.4–1.2)
ERYTHROCYTE [DISTWIDTH] IN BLOOD BY AUTOMATED COUNT: 23.9 % (ref 11.5–14.5)
ERYTHROCYTE [DISTWIDTH] IN BLOOD BY AUTOMATED COUNT: 92.9 FL (ref 35–45)
GFR SERPL CREATININE-BSD FRML MDRD: 21 ML/MIN/1.73M2
GLUCOSE BLD-MCNC: 89 MG/DL (ref 70–108)
HCT VFR BLD CALC: 26.7 % (ref 42–52)
HCT VFR BLD CALC: 27.9 % (ref 42–52)
HEMOGLOBIN: 8.2 GM/DL (ref 14–18)
HEMOGLOBIN: 8.7 GM/DL (ref 14–18)
MCH RBC QN AUTO: 33.2 PG (ref 26–33)
MCHC RBC AUTO-ENTMCNC: 30.7 GM/DL (ref 32.2–35.5)
MCV RBC AUTO: 108.1 FL (ref 80–94)
PLATELET # BLD: 136 THOU/MM3 (ref 130–400)
PMV BLD AUTO: 12.6 FL (ref 9.4–12.4)
POTASSIUM SERPL-SCNC: 4.4 MEQ/L (ref 3.5–5.2)
RBC # BLD: 2.47 MILL/MM3 (ref 4.7–6.1)
SODIUM BLD-SCNC: 141 MEQ/L (ref 135–145)
WBC # BLD: 6.9 THOU/MM3 (ref 4.8–10.8)

## 2020-08-20 PROCEDURE — 6370000000 HC RX 637 (ALT 250 FOR IP): Performed by: INTERNAL MEDICINE

## 2020-08-20 PROCEDURE — 6370000000 HC RX 637 (ALT 250 FOR IP): Performed by: PHYSICIAN ASSISTANT

## 2020-08-20 PROCEDURE — 99233 SBSQ HOSP IP/OBS HIGH 50: CPT | Performed by: UROLOGY

## 2020-08-20 PROCEDURE — 2580000003 HC RX 258: Performed by: PHYSICIAN ASSISTANT

## 2020-08-20 PROCEDURE — 94760 N-INVAS EAR/PLS OXIMETRY 1: CPT

## 2020-08-20 PROCEDURE — 85018 HEMOGLOBIN: CPT

## 2020-08-20 PROCEDURE — 99232 SBSQ HOSP IP/OBS MODERATE 35: CPT | Performed by: INTERNAL MEDICINE

## 2020-08-20 PROCEDURE — APPSS60 APP SPLIT SHARED TIME 46-60 MINUTES: Performed by: PHYSICIAN ASSISTANT

## 2020-08-20 PROCEDURE — 6370000000 HC RX 637 (ALT 250 FOR IP): Performed by: NURSE PRACTITIONER

## 2020-08-20 PROCEDURE — 97166 OT EVAL MOD COMPLEX 45 MIN: CPT

## 2020-08-20 PROCEDURE — 80048 BASIC METABOLIC PNL TOTAL CA: CPT

## 2020-08-20 PROCEDURE — 36415 COLL VENOUS BLD VENIPUNCTURE: CPT

## 2020-08-20 PROCEDURE — 51798 US URINE CAPACITY MEASURE: CPT

## 2020-08-20 PROCEDURE — 2060000000 HC ICU INTERMEDIATE R&B

## 2020-08-20 PROCEDURE — 97110 THERAPEUTIC EXERCISES: CPT

## 2020-08-20 PROCEDURE — 85014 HEMATOCRIT: CPT

## 2020-08-20 PROCEDURE — 85027 COMPLETE CBC AUTOMATED: CPT

## 2020-08-20 RX ORDER — MIDODRINE HYDROCHLORIDE 2.5 MG/1
2.5 TABLET ORAL 2 TIMES DAILY WITH MEALS
Status: DISCONTINUED | OUTPATIENT
Start: 2020-08-20 | End: 2020-08-21 | Stop reason: HOSPADM

## 2020-08-20 RX ADMIN — MIDODRINE HYDROCHLORIDE 2.5 MG: 2.5 TABLET ORAL at 08:59

## 2020-08-20 RX ADMIN — SODIUM BICARBONATE 1300 MG: 650 TABLET ORAL at 21:02

## 2020-08-20 RX ADMIN — AMIODARONE HYDROCHLORIDE 400 MG: 200 TABLET ORAL at 08:59

## 2020-08-20 RX ADMIN — POLYETHYLENE GLYCOL 3350 17 G: 17 POWDER, FOR SOLUTION ORAL at 11:36

## 2020-08-20 RX ADMIN — SODIUM BICARBONATE 1300 MG: 650 TABLET ORAL at 08:59

## 2020-08-20 RX ADMIN — Medication 10 ML: at 08:59

## 2020-08-20 RX ADMIN — LEVOTHYROXINE SODIUM 100 MCG: 100 TABLET ORAL at 05:26

## 2020-08-20 RX ADMIN — METOPROLOL TARTRATE 25 MG: 25 TABLET, FILM COATED ORAL at 08:59

## 2020-08-20 RX ADMIN — MIDODRINE HYDROCHLORIDE 2.5 MG: 2.5 TABLET ORAL at 11:36

## 2020-08-20 RX ADMIN — Medication 10 ML: at 21:02

## 2020-08-20 RX ADMIN — BUMETANIDE 1 MG: 1 TABLET ORAL at 08:59

## 2020-08-20 RX ADMIN — ATORVASTATIN CALCIUM 20 MG: 20 TABLET, FILM COATED ORAL at 08:59

## 2020-08-20 RX ADMIN — DOCUSATE SODIUM 100 MG: 100 CAPSULE, LIQUID FILLED ORAL at 08:59

## 2020-08-20 RX ADMIN — MIDODRINE HYDROCHLORIDE 2.5 MG: 2.5 TABLET ORAL at 17:41

## 2020-08-20 RX ADMIN — METOPROLOL TARTRATE 25 MG: 25 TABLET, FILM COATED ORAL at 21:02

## 2020-08-20 RX ADMIN — PANTOPRAZOLE SODIUM 40 MG: 40 TABLET, DELAYED RELEASE ORAL at 05:29

## 2020-08-20 RX ADMIN — ALLOPURINOL 100 MG: 100 TABLET ORAL at 08:59

## 2020-08-20 ASSESSMENT — PAIN SCALES - GENERAL
PAINLEVEL_OUTOF10: 0

## 2020-08-20 NOTE — PROGRESS NOTES
Orlin Connell covering [address]  Daily Progress Note    Pt Name: Shashi De Anda  Medical Record Number: [id number]  Date of Birth 2/21/1930   Today's Date: 8/20/2020    HD: # 6    CC: \"Okay\"    Orion Barba Problems    Diagnosis Date Noted    Fall [W19. XXXA] 08/18/2020    Traumatic retroperitoneal hematoma [S36.892A] 08/18/2020    Hematoma of right kidney [S37.011A] 08/18/2020    Acute kidney injury superimposed on CKD ([facility] [address] [N17.9, N18.9] 08/18/2020    Hypotension [I95.9] 08/18/2020    Acute blood loss anemia [D62]     Retroperitoneal bleeding [R58] 08/14/2020       PLAN  Patient admitted under Trauma Services to the ICU with Tele   - Patient now on 4K     Retroperitoneal hematoma & subcapsular renal hematoma   - Repeat abdominal CT yesterday notes decreasing size of retroperitoneal hematoma but component that surrounds and possibly compresses the right kidney   - Urology consulted, plan to treat conservatively   - Monitor creatinine and hemoglobin closely              - Serial H&Hs continue - stable at 8.2 this AM   - Transfused 1 unit PRBCs 8/16, 1 unit PRBCs 8/17   - Transfuse as necessary, keep Hgb >7.0              - Vital signs monitoring              - Given Kcentra and vitamin K on admission for INR of 1.51              - TEG parameters within normal range on admission   - May increase activity    CALVIN on CKD   - Nephrology assisting with management   - Felt to be multifactorial from hypotension, blood loss, renal hematoma   - Nephrology stopping fluids and resuming home Bumex   - Metabolic acidosis and nephrology adding p.o. bicarb   - Urology consulted, no intervention planned.     Leukocytosis, resolved              - 10.9 on admission, 9.1 8/15   - Likely reactive   - Afebrile this admission     Acute blood loss anemia              - Serial H&H now remains above 8.0   - Transfused 1 unit PRBCs 8/16, 1 unit PRBCs 8/17   - Transfuse as necessary, keep Hgb >7.0    Hypotension - resolved   - Likely from blood loss   - Continues on Midodrine, dose to 2.5 TID    Chronic atrial fibrillation, rate controlled   - Hold OAC for now   - Hospitalist assisting with management   - Continue beta blocker and Amiodarone    Hypothryoidism   - Continue Synthroid    HFrEF - not acutely decompensated   - Diuretics resuming 8/19 per nephro   - Continue beta blocker    CAD   - Continue Lipitor   - Hold Aspirin     Consults: Bonita Orellana, Nephrology, Urology     Pain Management              - Fentanyl, Tylenol     Prophylaxis: SCD's, Incentive Spirometry, Colace, Protonix, Zofran     General diet     Gentle IVF Management  Regular Neurovascular Checks  Repeat Labs Tomorrow AM  PT/OT - can eval and treat now  Increase activity as tolerated     Planned Discharge pending clinical course. - From [facility] with plans to return at discharge with [place]   - Stable from trauma perspective   - Discharge pending medical stability       SUBJECTIVE  Patient continues no 4K. Complains of persistent mild right flank and right lower abdominal pain, feels very weak today. Sitting upright in chair. Says he wouldn't have been able to stand without nursing staff help. States he is still a little short of breath when he moves. Patient denies chest pain, cough, headache, dizziness, lightheadedness, numbness, paraesthesias, weakness, nausea, vomiting. Plan to monitor for signs of continued blood loss. Hemoglobin stable at 8.2, BUN and creatinine remain in injury, vital signs stable on exam. Stable from trauma perspective.     Wt Readings from Last 3 Encounters:   08/20/20 231 lb (104.8 kg)     Temp Readings from Last 3 Encounters:   08/20/20 97.6 °F (36.4 °C) (Oral)     BP Readings from Last 3 Encounters:   08/20/20 125/62     Pulse Readings from Last 3 Encounters:   08/20/20 79       24 HR INTAKE/OUTPUT :     Intake/Output Summary (Last 24 hours) at 8/20/2020 [place] filed at 8/20/2020 0340  Gross per 24 hour   Intake 1281.23 ml   Output 1350 ml   Net -68.77 ml     DIET GENERAL;    OBJECTIVE  CURRENT VITALS /62   Pulse 79   Temp 97.6 °F (36.4 °C) (Oral)   Resp 20   Ht 6' (1.829 m)   Wt 231 lb (104.8 kg)   SpO2 96%   BMI 31.33 kg/m²      GENERAL: Presents sitting upright in chair unassisted, Awake and alert; In no acute distress and well nourished. SKIN: Appropriate for ethnicity, warm and dry. EYES: No apparent trauma, discharge, or hematoma bilaterally. PERRL at 3mm  CARDIO: No visible chest wall deformity. No heaves or lifts. Strong/regular S1/S2 rate and rhythm. No rubs murmurs, or gallops. 2+ radial and dorsalis pedal pulses. Capillary refill <2 sec. 2+ pitting edema noted to bilateral lower extremities. PULMONARY:  Trachea midline, no audible wheezing, increased respiratory effort, accessory muscle use, or asymmetrical chest wall movement. Lung sounds are clear to ascultation with diminished bases. No wheezing, crackles, or rhonchi. ABDOMEN: Abdomen is non distended. Ecchymosis noted to bilateral flanks. Bowel sounds present in all four quadrants. Soft and mildly tender to right upper quadrant palpation. NEURO: Follows commands. PMS intact, moves limbs freely. MSK: Extremities intact and present. No new bruising, swelling, deformity, discoloration or bleeding. No new tenderness to muscular or bony structure palpation.  strength 5/5 and equal bilaterally. LABS  CBC :   Recent Labs     08/19/20  0558 08/19/20  1834 08/20/20  0542   WBC  --   --  6.9   HGB 8.2* 8.5* 8.2*   HCT 26.9* 26.9* 26.7*   MCV  --   --  108.1*   PLT  --   --  136     BMP:   Recent Labs     08/18/20  1842 08/19/20  0059 08/20/20  0542    138 141   K 4.3 4.5 4.4    107 110   CO2 18* 17* 19*   BUN 60* 61* 61*   CREATININE 2.9* 3.0* 2.9*     COAGS:   No results for input(s): APTT, PROT, INR in the last 72 hours.   Pancreas/HFP:  No results for input(s): LIPASE, AMYLASE in the last 72 hours. No results for input(s): AST, ALT, BILIDIR, BILITOT, ALKPHOS in the last 72 hours. RADIOLOGY:  Narrative    PROCEDURE: CT ABDOMEN PELVIS WO CONTRAST         CLINICAL INFORMATION: follow up retroperitoneal hematoma, compare to initial CT to ensure no active bleeding .         COMPARISON: CT abdomen and pelvis 8/14/2020.         TECHNIQUE: Axial 5 mm CT images were obtained through the abdomen and pelvis. No contrast was given. Coronal and sagittal reconstructions were obtained.         All CT scans at this facility use dose modulation, iterative reconstruction, and/or weight-based dosing when appropriate to reduce radiation dose to as low as reasonably achievable.         FINDINGS:              There are moderate-sized layering bilateral pleural effusions. These have increased in size. There is compressive atelectasis dependently of the lower lobes bilaterally. Pacemaker wires are seen in the heart.  The patient has had a prior median    sternotomy and CABG.         The liver is grossly normal. The spleen is normal. There is some stable free fluid adjacent to the spleen.         The gallbladder adrenal glands and pancreas are within acceptable limits.         There is a high attenuation collection in the retroperitoneum on the right this is inferior and medial to the right kidney. This extends into the pelvis at the level the pelvic rim. This is decreased in size. This is consistent with a retroperitoneal    hematoma. On the axial images at its largest it measures 9.7 x 5.6 cm. At the same location on the prior examination, this measured 10 x 7.6 cm.         There is improving surrounding fat stranding in the mesentery. There is improving fat stranding anterior to the inferior vena cava and abdominal aorta.         There is a component of the hematoma which surrounds the right kidney deep to the renal fascia. This could've been the source of the hemorrhage.  There is thinning of the renal cortex bilaterally. This is relatively symmetric. There is no hydronephrosis    on either side. No underlying mass lesion is noted on the right. This assessment is limited due to the lack of IV contrast. There is no contour deforming renal lesion on the left.         The small bowel loops are relatively decompressed. There is no free air. The IVC and aorta are of normal caliber.  There is calcified atherosclerosis of the aorta. There is no adenopathy.              The urinary bladder is collapsed around a Solis catheter. There is a small amount of pelvic free fluid. There are surgical clips in the pelvis  there is diverticulosis of the colon. There is no evidence of diverticulitis. There is diffuse anasarca. There     is osseous demineralization. There is an old compression fracture of L1.  No suspicious osseous lesions are present.                        Impression         1. Large right-sided retroperitoneal hematoma. This has decreased in size compared to examination from 3 days ago. 2. There is a component which surrounds, and possibly compresses, the right kidney. Question with patient's creatinine is recommended. No underlying mass lesion is identified on this noncontrast exam.    3. Enlarging moderate-sized bilateral pleural effusions with bibasilar compressive atelectasis.                     **This report has been created using voice recognition software. It may contain minor errors which are inherent in voice recognition technology. **         Final report electronically signed by Dr. Thao Miranda on 8/18/2020 10:56 AM         Electronically signed by SUNIL Wagner on 8/20/2020 at 8:14 AM

## 2020-08-20 NOTE — PROGRESS NOTES
[facility] ICU STEPDOWN TELEMETRY 4K  EVALUATION    Time:    Time In: 5050  Time Out: 3002  Timed Code Treatment Minutes: 12 Minutes  Minutes: 27          Date: 2020  Patient Name: Shashi De Anda,   Gender: male      MRN: [id number]  : 1930  (80 y.o.)  Referring Practitioner: Dr. Lor Berman  Diagnosis: retroperitoneal bleeding  Additional Pertinent Hx: 80 y.o. male who presents as a transfer on 2020 for right retroperitoneal hemorrhage on Coumadin. Restrictions/Precautions:  Restrictions/Precautions: Fall Risk  Position Activity Restriction  Other position/activity restrictions: SOB, +2    Subjective  Chart Reviewed: Yes, Orders, Progress Notes, History and Physical  Patient assessed for rehabilitation services?: Yes  Family / Caregiver Present: No    Subjective: cooperative, noted some confusion, [place] & requiring things to be repeated. 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 Pt reports he doesn't walk too well. Unable to get clarification on ADL A level. Cognition/Orientation:     Overall Cognitive Status: (confusion noted)    ADL's:  Feeding: Setup(for breakfast in recliner)  Grooming: Setup(washed face while seated in recliner)  LE Dressing: Dependent/Total(for donning slipper socks)       Functional Mobility:  Bed mobility  Supine to Sit: Moderate assistance    Functional Mobility  Functional - Mobility Device: Rolling Walker  Activity: Other(3 steps to recliner)  Assist Level:  Moderate assistance(x 2; poor safety awareness)  Functional Mobility Comments: required A for moving walker Education: Plan of Care, OT Role, ADL Adaptive Strategies, Transfer Training  Barriers to Learning: confusion    Equipment Recommendations: Other: Monitor pending progress    Plan:  Times per week: 5x  Current Treatment Recommendations: Balance Training, Self-Care / ADL, Functional Mobility Training, Cognitive Reorientation, Endurance Training, Safety Education & Training    Goals:  Patient goals : Pt did not state  Short term goals  Time Frame for Short term goals: until discharge  Short term goal 1: Complete various t/fs including BSC with 0>min A x 2 & min vcs for safety  Short term goal 2: Tolerate standing 1-2 min with min A x 1 for increased ease of toileting  Short term goal 3: Complete BUE light strengthening exercises to increase UB endurance to A with BADL  Short term goal 4: Complete mobility to bedside chair or BSC with RW & min A x 2 & min vcs for safety  Long term goals  Time Frame for Long term goals : No LTG set d/t short ELOS  See long-term goal time frame for expected duration of plan of care. If no long-term goals established, a short length of stay is anticipated. Following session, patient left in safe position with all fall risk precautions in place.

## 2020-08-20 NOTE — PROGRESS NOTES
----- Message from Deneen White MD sent at 12/31/2018  9:16 AM CST -----  Patient had a pap smear at [facility] OB/gynecology. Thanks   1500:  Solis catheter removed. 1840:  Post void residual: 35 ml.

## 2020-08-20 NOTE — PROGRESS NOTES
Renal Progress Note  [facility]    Patient :  Lokesh Seay; 80 y.o. MRN# [id number]  Location:  5B-27/578-B  Attending:  Pamela Blue MD  Admit Date:  8/14/2020   Hospital Day: 6      Subjective:     Nephrology is following the patient for CALVIN on CKD. This is a late entry. Patient was seen earlier today. Hemoglobin has been stable. He says he is feeling fine. Blood pressures are stable. Renal function is stable. Denies shortness of breath. Outpatient Medications:     Medications Prior to Admission: polyethylene glycol (GLYCOLAX) 17 g packet, Take 17 g by mouth daily  allopurinol (ZYLOPRIM) 100 MG tablet, Take 100 mg by mouth daily  atorvastatin (LIPITOR) 20 MG tablet, Take 20 mg by mouth daily  metoprolol tartrate (LOPRESSOR) 25 MG tablet, Take 25 mg by mouth 2 times daily Hold if SBP <100 or DBP<60  docusate sodium (COLACE) 100 MG capsule, Take 100 mg by mouth daily  levothyroxine (SYNTHROID) 100 MCG tablet, Take 100 mcg by mouth Daily  acetaminophen (TYLENOL) 500 MG tablet, Take 500 mg by mouth every 6 hours as needed for Pain  meclizine (ANTIVERT) 12.5 MG tablet, Take 12.5 mg by mouth 3 times daily as needed for Dizziness  warfarin (COUMADIN) 5 MG tablet, Take 5 mg by mouth daily  bumetanide (BUMEX) 2 MG tablet, Take 2 mg by mouth daily (with breakfast)   bumetanide (BUMEX) 1 MG tablet, Take 1 mg by mouth Daily with lunch  zolpidem (AMBIEN) 5 MG tablet, Take 5 mg by mouth nightly. potassium citrate (UROCIT-K) 10 MEQ (1080 MG) extended release tablet, Take 10 mEq by mouth daily  amiodarone (CORDARONE) 200 MG tablet, Take 400 mg by mouth daily  aspirin 81 MG EC tablet, Take 81 mg by mouth nightly  nitroGLYCERIN (NITROSTAT) 0.4 MG SL tablet, Place 0.4 mg under the tongue every 5 minutes as needed for Chest pain up to max of 3 total doses. If no relief after 1 dose, call 911.     Current Medications:     Scheduled Meds:    midodrine  2.5 mg Oral BID WC    bumetanide  1 mg Oral Daily    sodium bicarbonate  1,300 mg Oral BID    pantoprazole  40 mg Oral QAM AC    sodium chloride  20 mL Intravenous Once    sodium chloride flush  10 mL Intravenous 2 times per day    allopurinol  100 mg Oral Daily    amiodarone  400 mg Oral Daily    atorvastatin  20 mg Oral Daily    docusate sodium  100 mg Oral Daily    levothyroxine  100 mcg Oral Daily    metoprolol tartrate  25 mg Oral BID     Continuous Infusions:     PRN Meds:  zolpidem, albuterol, sodium chloride flush, acetaminophen, polyethylene glycol, promethazine **OR** ondansetron, fentanNYL    Input/Output:       I/O last 3 completed shifts: In: 1281.2 [P.O.:870; I.V.:411.2]  Out: 1350 [Urine:1350]. Patient Vitals for the past 96 hrs (Last 3 readings):   Weight   20 0515 231 lb (104.8 kg)   20 0317 231 lb (104.8 kg)       Vital Signs:   Temperature:  Temp: 98.3 °F (36.8 °C)  TMax:   Temp (24hrs), Av.9 °F (36.6 °C), Min:97.5 °F (36.4 °C), Max:98.3 °F (36.8 °C)    Respirations:  Resp: 22  Pulse:   Pulse: 80  BP:    BP: 113/64  BP Range: Systolic (39VBD), HYB:412 , Min:113 , DXL:833       Diastolic (45XCI), YUS:32, Min:61, Max:65      Physical Examination:     General:  Awake, nontoxic, hard of hearing  HEENT: NC/AT/ MMM   Chest:               diminished  Cardiac:  irregular  Abdomen: Soft, non-tender,  Neuro:  No facial droop, No Asterixis  SKIN:  No rashes, good skin turgor.   Extremities:  1+ LE edema, no cyanosis    Labs:       Recent Labs     20  0558 20  1834 20  0542   WBC  --   --  6.9   RBC  --   --  2.47*   HGB 8.2* 8.5* 8.2*   HCT 26.9* 26.9* 26.7*   MCV  --   --  108.1*   MCH  --   --  33.2*   MCHC  --   --  30.7*   PLT  --   --  136   MPV  --   --  12.6*      BMP:   Recent Labs     20  1842 20  0059 20  0542    138 141   K 4.3 4.5 4.4    107 110   CO2 18* 17* 19*   BUN 60* 61* 61*   CREATININE 2.9* 3.0* 2.9*   GLUCOSE 99 101 89   CALCIUM 8.5 8.6 8.6 Phosphorus:   No results for input(s): PHOS in the last 72 hours. Magnesium:  No results for input(s): MG in the last 72 hours. Albumin:    No results for input(s): LABALBU in the last 72 hours. BNP:    No results found for: BNP  OLIVIA:    No results found for: OLIVIA  SPEP:  Lab Results   Component Value Date    PROT 4.7 08/15/2020     UPEP:   No results found for: LABPE  C3:   No results found for: C3  C4:   No results found for: C4  MPO ANCA:   No results found for: MPO  PR3 ANCA:   No results found for: PR3  Anti-GBM:   No results found for: GBMABIGG  Hep BsAg:       No results found for: HEPBSAG  Hep C AB:        No results found for: HEPCAB    Urinalysis/Chemistries:    No results found for: NITRU, COLORU, PHUR, LABCAST, WBCUA, RBCUA, MUCUS, TRICHOMONAS, YEAST, BACTERIA, CLARITYU, SPECGRAV, LEUKOCYTESUR, UROBILINOGEN, BILIRUBINUR, BLOODU, GLUCOSEU, KETUA, AMORPHOUS  Urine Sodium:   No results found for: PALLAVI  Urine Potassium:  No results found for: KUR  Urine Chloride:  No results found for: CLUR  Urine Osmolarity: No results found for: OSMOU  Urine Protein:   No components found for: TOTALPROTEIN, URINE   Urine Creatinine:   No results found for: LABCREA  Urine Eosinophils:  No components found for: UEOS        Impression and Plan:  1. CALVIN on CKD :renal function overall worse from admission but has remained stable. Likely multifactorial from hypotension, blood loss, renal hematoma  -continue with po Bumex for volume overload    2. Hypotension on Midodrine, decrease to 2.5 mg BID  3. Acute blood loss anemia: requiring PRBC transfusions, Hemoglobin has remained stable  4. Metabolic acidosis : started on po bicarb  5. Subcapsular renal hematoma and retroperitoneal hematoma  6. Systolic CHF, EF 86-22%  7. Afib  8. CAD/CABG  9. S/p fall    Stable from renal standpoint. Okay to remove alejandro catheter. Please don't hesitate to call with any questions.   Electronically signed by Salo Ramírez DO on

## 2020-08-20 NOTE — PLAN OF CARE
Hourly rounding. Problem: Discharge Planning:  Goal: Discharged to appropriate level of care  Description: Discharged to appropriate level of care  8/19/2020 2319 by Ibis Lester RN  Outcome: Ongoing  Note: Patient plans to [facility] when medically stable. Problem: Urinary Elimination:  Goal: Signs and symptoms of infection will decrease  Description: Signs and symptoms of infection will decrease  8/19/2020 2319 by Ibis Lester RN  Outcome: Ongoing  Note: Solis catheter remains in place and draining adequate amounts of urine. Problem: Bleeding:  Goal: Will show no signs and symptoms of excessive bleeding  Description: Will show no signs and symptoms of excessive bleeding  8/19/2020 2319 by Ibis Lester RN  Outcome: Ongoing  Note: No signs of bleeding at this time. Problem: Bleeding:  Goal: Will show no signs and symptoms of excessive bleeding  Description: Will show no signs and symptoms of excessive bleeding  8/19/2020 2319 by Ibis Lester RN  Outcome: Ongoing  Note: No signs of bleeding at this time. Problem: Bleeding:  Goal: Will show no signs and symptoms of excessive bleeding  Description: Will show no signs and symptoms of excessive bleeding  8/19/2020 2319 by Ibis Lester RN  Outcome: Ongoing  Note: No signs of bleeding at this time. Problem: Bleeding:  Goal: Will show no signs and symptoms of excessive bleeding  Description: Will show no signs and symptoms of excessive bleeding  8/19/2020 2319 by Ibis Lester RN  Outcome: Ongoing  Note: No signs of bleeding at this time. Problem: Bleeding:  Goal: Will show no signs and symptoms of excessive bleeding  Description: Will show no signs and symptoms of excessive bleeding  8/19/2020 2319 by Ibis Lester RN  Outcome: Ongoing  Note: No signs of bleeding at this time.       Problem: Bleeding:  Goal: Will show no signs and symptoms of excessive bleeding  Description: Will show no signs and symptoms of excessive bleeding  8/19/2020 2319 by Phylicia Roy RN  Outcome: Ongoing  Note: No signs of bleeding at this time. Problem: Bleeding:  Goal: Will show no signs and symptoms of excessive bleeding  Description: Will show no signs and symptoms of excessive bleeding  8/19/2020 2319 by Phylicia Roy RN  Outcome: Ongoing  Note: No signs of bleeding at this time. Problem: Bleeding:  Goal: Will show no signs and symptoms of excessive bleeding  Description: Will show no signs and symptoms of excessive bleeding  8/19/2020 2319 by Phylicia Roy RN  Outcome: Ongoing  Note: No signs of bleeding at this time. Care plan reviewed with patient. Patients verbalize understanding of the plan of care and contributes to goal setting. Reinforcement needed.

## 2020-08-21 ENCOUNTER — TELEPHONE (OUTPATIENT)
Dept: UROLOGY | Age: 85
End: 2020-08-21

## 2020-08-21 VITALS
HEART RATE: 80 BPM | HEIGHT: 72 IN | WEIGHT: 215.7 LBS | OXYGEN SATURATION: 98 % | TEMPERATURE: 97.4 F | BODY MASS INDEX: 29.22 KG/M2 | DIASTOLIC BLOOD PRESSURE: 65 MMHG | RESPIRATION RATE: 24 BRPM | SYSTOLIC BLOOD PRESSURE: 130 MMHG

## 2020-08-21 LAB
ANION GAP SERPL CALCULATED.3IONS-SCNC: 12 MEQ/L (ref 8–16)
BUN BLDV-MCNC: 57 MG/DL (ref 7–22)
CALCIUM SERPL-MCNC: 8.4 MG/DL (ref 8.5–10.5)
CHLORIDE BLD-SCNC: 111 MEQ/L (ref 98–111)
CO2: 21 MEQ/L (ref 23–33)
CREAT SERPL-MCNC: 2.6 MG/DL (ref 0.4–1.2)
GFR SERPL CREATININE-BSD FRML MDRD: 23 ML/MIN/1.73M2
GLUCOSE BLD-MCNC: 94 MG/DL (ref 70–108)
HCT VFR BLD CALC: 26.1 % (ref 42–52)
HEMOGLOBIN: 8.3 GM/DL (ref 14–18)
IRON SATURATION: 28 % (ref 20–50)
IRON: 36 UG/DL (ref 65–195)
POTASSIUM SERPL-SCNC: 3.7 MEQ/L (ref 3.5–5.2)
SODIUM BLD-SCNC: 144 MEQ/L (ref 135–145)
TOTAL IRON BINDING CAPACITY: 129 UG/DL (ref 171–450)

## 2020-08-21 PROCEDURE — 85018 HEMOGLOBIN: CPT

## 2020-08-21 PROCEDURE — 36415 COLL VENOUS BLD VENIPUNCTURE: CPT

## 2020-08-21 PROCEDURE — 2580000003 HC RX 258: Performed by: PHYSICIAN ASSISTANT

## 2020-08-21 PROCEDURE — 83550 IRON BINDING TEST: CPT

## 2020-08-21 PROCEDURE — 6370000000 HC RX 637 (ALT 250 FOR IP): Performed by: PHYSICIAN ASSISTANT

## 2020-08-21 PROCEDURE — 99232 SBSQ HOSP IP/OBS MODERATE 35: CPT | Performed by: INTERNAL MEDICINE

## 2020-08-21 PROCEDURE — 6370000000 HC RX 637 (ALT 250 FOR IP): Performed by: NURSE PRACTITIONER

## 2020-08-21 PROCEDURE — 6370000000 HC RX 637 (ALT 250 FOR IP): Performed by: INTERNAL MEDICINE

## 2020-08-21 PROCEDURE — 51798 US URINE CAPACITY MEASURE: CPT

## 2020-08-21 PROCEDURE — 83540 ASSAY OF IRON: CPT

## 2020-08-21 PROCEDURE — APPSS60 APP SPLIT SHARED TIME 46-60 MINUTES: Performed by: PHYSICIAN ASSISTANT

## 2020-08-21 PROCEDURE — 80048 BASIC METABOLIC PNL TOTAL CA: CPT

## 2020-08-21 PROCEDURE — 2500000003 HC RX 250 WO HCPCS: Performed by: INTERNAL MEDICINE

## 2020-08-21 PROCEDURE — 94760 N-INVAS EAR/PLS OXIMETRY 1: CPT

## 2020-08-21 PROCEDURE — 85014 HEMATOCRIT: CPT

## 2020-08-21 RX ORDER — SODIUM BICARBONATE 650 MG/1
1300 TABLET ORAL 2 TIMES DAILY
Qty: 120 TABLET | Refills: 0 | Status: SHIPPED | OUTPATIENT
Start: 2020-08-21 | End: 2020-09-21

## 2020-08-21 RX ORDER — PANTOPRAZOLE SODIUM 40 MG/1
40 TABLET, DELAYED RELEASE ORAL
Qty: 30 TABLET | Refills: 3 | Status: SHIPPED | OUTPATIENT
Start: 2020-08-22

## 2020-08-21 RX ORDER — FINASTERIDE 5 MG/1
5 TABLET, FILM COATED ORAL DAILY
Qty: 30 TABLET | Refills: 3 | Status: SHIPPED | OUTPATIENT
Start: 2020-08-21

## 2020-08-21 RX ORDER — ZOLPIDEM TARTRATE 5 MG/1
5 TABLET ORAL NIGHTLY PRN
Qty: 14 TABLET | Refills: 0 | Status: SHIPPED | OUTPATIENT
Start: 2020-08-21 | End: 2020-09-04

## 2020-08-21 RX ORDER — ALBUTEROL SULFATE 2.5 MG/3ML
5 SOLUTION RESPIRATORY (INHALATION) EVERY 4 HOURS PRN
Qty: 120 EACH | Refills: 3 | Status: SHIPPED | OUTPATIENT
Start: 2020-08-21

## 2020-08-21 RX ORDER — POLYETHYLENE GLYCOL 3350 17 G/17G
17 POWDER, FOR SOLUTION ORAL DAILY PRN
Qty: 30 EACH | Refills: 0 | Status: SHIPPED | OUTPATIENT
Start: 2020-08-21 | End: 2020-09-20

## 2020-08-21 RX ORDER — LEVOTHYROXINE SODIUM 0.1 MG/1
100 TABLET ORAL DAILY
Qty: 30 TABLET | Refills: 3 | Status: SHIPPED | OUTPATIENT
Start: 2020-08-22

## 2020-08-21 RX ORDER — MIDODRINE HYDROCHLORIDE 2.5 MG/1
2.5 TABLET ORAL 2 TIMES DAILY WITH MEALS
Qty: 90 TABLET | Refills: 3 | Status: SHIPPED | OUTPATIENT
Start: 2020-08-21

## 2020-08-21 RX ORDER — BUMETANIDE 0.25 MG/ML
1 INJECTION, SOLUTION INTRAMUSCULAR; INTRAVENOUS ONCE
Status: COMPLETED | OUTPATIENT
Start: 2020-08-21 | End: 2020-08-21

## 2020-08-21 RX ORDER — BUMETANIDE 1 MG/1
1 TABLET ORAL 2 TIMES DAILY
Qty: 30 TABLET | Refills: 3 | Status: SHIPPED | OUTPATIENT
Start: 2020-08-21

## 2020-08-21 RX ORDER — BUMETANIDE 1 MG/1
1 TABLET ORAL 2 TIMES DAILY
Status: DISCONTINUED | OUTPATIENT
Start: 2020-08-21 | End: 2020-08-21 | Stop reason: HOSPADM

## 2020-08-21 RX ORDER — PSEUDOEPHEDRINE HCL 30 MG
100 TABLET ORAL DAILY
COMMUNITY
Start: 2020-08-22

## 2020-08-21 RX ORDER — FINASTERIDE 5 MG/1
5 TABLET, FILM COATED ORAL DAILY
Status: DISCONTINUED | OUTPATIENT
Start: 2020-08-21 | End: 2020-08-21 | Stop reason: HOSPADM

## 2020-08-21 RX ADMIN — DOCUSATE SODIUM 100 MG: 100 CAPSULE, LIQUID FILLED ORAL at 09:45

## 2020-08-21 RX ADMIN — ALLOPURINOL 100 MG: 100 TABLET ORAL at 09:45

## 2020-08-21 RX ADMIN — PANTOPRAZOLE SODIUM 40 MG: 40 TABLET, DELAYED RELEASE ORAL at 05:42

## 2020-08-21 RX ADMIN — ATORVASTATIN CALCIUM 20 MG: 20 TABLET, FILM COATED ORAL at 09:45

## 2020-08-21 RX ADMIN — Medication 10 ML: at 09:46

## 2020-08-21 RX ADMIN — MIDODRINE HYDROCHLORIDE 2.5 MG: 2.5 TABLET ORAL at 18:22

## 2020-08-21 RX ADMIN — BUMETANIDE 1 MG: 1 TABLET ORAL at 18:21

## 2020-08-21 RX ADMIN — BUMETANIDE 1 MG: 0.25 INJECTION INTRAMUSCULAR; INTRAVENOUS at 09:45

## 2020-08-21 RX ADMIN — AMIODARONE HYDROCHLORIDE 400 MG: 200 TABLET ORAL at 09:45

## 2020-08-21 RX ADMIN — METOPROLOL TARTRATE 25 MG: 25 TABLET, FILM COATED ORAL at 18:21

## 2020-08-21 RX ADMIN — POTASSIUM BICARBONATE 20 MEQ: 782 TABLET, EFFERVESCENT ORAL at 09:45

## 2020-08-21 RX ADMIN — ACETAMINOPHEN 650 MG: 325 TABLET ORAL at 11:51

## 2020-08-21 RX ADMIN — SODIUM BICARBONATE 1300 MG: 650 TABLET ORAL at 09:45

## 2020-08-21 RX ADMIN — LEVOTHYROXINE SODIUM 100 MCG: 100 TABLET ORAL at 05:42

## 2020-08-21 RX ADMIN — FINASTERIDE 5 MG: 5 TABLET, FILM COATED ORAL at 14:41

## 2020-08-21 RX ADMIN — SODIUM BICARBONATE 1300 MG: 650 TABLET ORAL at 18:21

## 2020-08-21 RX ADMIN — METOPROLOL TARTRATE 25 MG: 25 TABLET, FILM COATED ORAL at 09:45

## 2020-08-21 RX ADMIN — MIDODRINE HYDROCHLORIDE 2.5 MG: 2.5 TABLET ORAL at 09:45

## 2020-08-21 ASSESSMENT — PAIN SCALES - GENERAL
PAINLEVEL_OUTOF10: 0
PAINLEVEL_OUTOF10: 0
PAINLEVEL_OUTOF10: 3
PAINLEVEL_OUTOF10: 0

## 2020-08-21 NOTE — PROGRESS NOTES
Ra Carter covering for Dr. Orozco Or  Daily Progress Note    Pt Name: Tammy Justin Record Number: [id number]  Date of Birth 2/21/1930   Today's Date: 8/21/2020    HD: # 7    CC: \"I'm doing fine\"  ASSESSMENT  Active Hospital Problems    Diagnosis Date Noted    Fall [W19. XXXA] 08/18/2020    Traumatic retroperitoneal hematoma [S36.892A] 08/18/2020    Hematoma of right kidney [S37.011A] 08/18/2020    Acute kidney injury superimposed on CKD ([facility] [address] [N17.9, N18.9] 08/18/2020    Hypotension [I95.9] 08/18/2020    Acute blood loss anemia [D62]     Retroperitoneal bleeding [R58] 08/14/2020       PLAN  Patient admitted under Trauma Services to the ICU with Tele   - Patient now on 4K     Retroperitoneal hematoma & subcapsular renal hematoma   - Repeat abdominal CT 8/18 noted decreasing size of retroperitoneal hematoma but component that surrounds and possibly compresses the right kidney   - Urology consulted, conservative management   - Monitor creatinine and hemoglobin closely              - Hgb 8.3 this AM   - Transfused 1 unit PRBCs 8/16, 1 unit PRBCs 8/17   - Transfuse as necessary to keep Hgb >7.0              - Vital signs monitoring              - Given Kcentra and vitamin K on admission for INR of 1.51              - TEG parameters within normal range on admission   - May increase activity    CALVIN on CKD   - Nephrology consulted   - [place] to be multifactorial from hypotension, blood loss, renal hematoma   - Nephrology stopping fluids and resuming home Bumex   - Metabolic acidosis and nephrology adding p.o. bicarb   - Urology consulted, conservative management     Leukocytosis, resolved              - 6.9 8/20   - Likely reactive   - Afebrile this admission     Acute blood loss anemia              - Hgb 8.3 this AM   - Transfused 1 unit PRBCs 8/16, 1 unit PRBCs 8/17   - Transfuse as necessary, keep Hgb >7.0    Hypotension - resolved   - Likely from blood loss   - Continues on Midodrine, dose to 2.5 TID    Chronic atrial fibrillation, rate controlled   - Hold OAC for now   - Hospitalist assisting with management   - Continue beta blocker and Amiodarone    Hypothryoidism   - Continue Synthroid    HFrEF - not acutely decompensated   - Diuretics resuming 8/19 per nephro   - Continue beta blocker    CAD   - Continue Lipitor   - Hold Aspirin       Consults: Yonis Flores, Nephrology, Urology     Pain Management              - Fentanyl, Tylenol     Prophylaxis: SCD's, Incentive Spirometry, Colace, Protonix, Zofran     General diet     Gentle IVF Management  Regular Neurovascular Checks  Repeat Labs Tomorrow AM  PT/OT - can eval and treat now  Increase activity as tolerated     Planned Discharge pending clinical course. - From [facility] with plans to return at discharge with [place]   - Stable from trauma perspective   - Discharge pending medical stability, anticipate today       SUBJECTIVE  Patient seen on 4K this morning. Sitting upright in bed at time of exam. Patient states he is feeling fine. Patient states he would like to work with therapy more today. Patient denies headache, neck pain, chest pain, shortness of breath, abdominal pain, nausea, vomiting. Hemoglobin stable at 8.3. Kidney function stable, Nephrology managing. Per Nephro, If discharged today check a BMP, CBC, iron saturation in 1 week and follow up in office in 2 weeks. Patient with urinary retention this AM, per nursing staff, Urology would like Solis placed and d/c with follow up in 2-3 weeks. Patient remains stable from a Trauma perspective. Patient will likely be discharged later today. OACs will be held for discharge and re-evaluated at follow up appointment in 1 week. Patient will have [place] nursing, aid, PT/OT at discharge. Care coordinated with Dr. Marina Sanchez.      Wt Readings from Last 3 Encounters:   08/21/20 215 lb 11.2 oz (97.8 kg)     Temp Readings from Last 3 Encounters: 08/21/20 97.6 °F (36.4 °C) (Oral)     BP Readings from Last 3 Encounters:   08/21/20 111/67     Pulse Readings from Last 3 Encounters:   08/21/20 80       24 HR INTAKE/OUTPUT :     Intake/Output Summary (Last 24 hours) at 8/21/2020 0901  Last data filed at 8/21/2020 0545  Gross per 24 hour   Intake 700 ml   Output 1025 ml   Net -325 ml     DIET GENERAL;    OBJECTIVE  CURRENT VITALS /67   Pulse 80   Temp 97.6 °F (36.4 °C) (Oral)   Resp 20   Ht 6' (1.829 m)   Wt 215 lb 11.2 oz (97.8 kg)   SpO2 96%   BMI 29.25 kg/m²      GENERAL: Awake, alert, sitting upright in bed eating breakfast.  NEURO: Follows commands. ZABALA spontaneously. CARDIO: Regular rate and rhythm with no murmurs. Peripheral pulses intact. PULMONARY: Lungs clear to auscultation bilaterally with normal work of breathing. Diminished at bases bilaterally. ABDOMEN: Soft, nontender, nondistended with normoactive bowel sounds. MSK: No cyanosis and no clubbing. LABS  CBC :   Recent Labs     08/20/20  0542 08/20/20  1835 08/21/20  0605   WBC 6.9  --   --    HGB 8.2* 8.7* 8.3*   HCT 26.7* 27.9* 26.1*   .1*  --   --      --   --      BMP:   Recent Labs     08/19/20  0059 08/20/20  0542 08/21/20  0605    141 144   K 4.5 4.4 3.7    110 111   CO2 17* 19* 21*   BUN 61* 61* 57*   CREATININE 3.0* 2.9* 2.6*     COAGS:   No results for input(s): APTT, PROT, INR in the last 72 hours. Pancreas/HFP:  No results for input(s): LIPASE, AMYLASE in the last 72 hours. No results for input(s): AST, ALT, BILIDIR, BILITOT, ALKPHOS in the last 72 hours. RADIOLOGY:  No new results. Electronically signed by Mike Murray PA-C on 8/21/2020 at 9:01 AM Patient seen and examined independently by me. Above discussed and I agree with CNP. Labs, cultures, and radiographs where available were reviewed. See orders for the updated patient care plan.     Emory Terry MD, had discussed case with Julia BARBER earlier in the day from our discussion it seemed patient could be discharged I came to see the patient this evening and he had already been discharged  8/21/2020   10:54 PM

## 2020-08-21 NOTE — PROGRESS NOTES
Renal Progress Note  [facility]    Patient :  Tracey Monroe; 80 y.o. MRN# [id number]  Location:  1B-99/088-X  Attending:  Frank Brennan MD  Admit Date:  8/14/2020   Hospital Day: 7      Subjective:     Nephrology is following the patient for CALVIN on CKD. Patient was seen earlier this morning. Solis was removed. States he is urinating frequently. Hemoglobin has been stable. No SOB but he hasn't been very mobile. On room air. Swelling in legs. Outpatient Medications:     Medications Prior to Admission: polyethylene glycol (GLYCOLAX) 17 g packet, Take 17 g by mouth daily  allopurinol (ZYLOPRIM) 100 MG tablet, Take 100 mg by mouth daily  atorvastatin (LIPITOR) 20 MG tablet, Take 20 mg by mouth daily  metoprolol tartrate (LOPRESSOR) 25 MG tablet, Take 25 mg by mouth 2 times daily Hold if SBP <100 or DBP<60  docusate sodium (COLACE) 100 MG capsule, Take 100 mg by mouth daily  levothyroxine (SYNTHROID) 100 MCG tablet, Take 100 mcg by mouth Daily  acetaminophen (TYLENOL) 500 MG tablet, Take 500 mg by mouth every 6 hours as needed for Pain  meclizine (ANTIVERT) 12.5 MG tablet, Take 12.5 mg by mouth 3 times daily as needed for Dizziness  warfarin (COUMADIN) 5 MG tablet, Take 5 mg by mouth daily  bumetanide (BUMEX) 2 MG tablet, Take 2 mg by mouth daily (with breakfast)   bumetanide (BUMEX) 1 MG tablet, Take 1 mg by mouth Daily with lunch  zolpidem (AMBIEN) 5 MG tablet, Take 5 mg by mouth nightly. potassium citrate (UROCIT-K) 10 MEQ (1080 MG) extended release tablet, Take 10 mEq by mouth daily  amiodarone (CORDARONE) 200 MG tablet, Take 400 mg by mouth daily  aspirin 81 MG EC tablet, Take 81 mg by mouth nightly  nitroGLYCERIN (NITROSTAT) 0.4 MG SL tablet, Place 0.4 mg under the tongue every 5 minutes as needed for Chest pain up to max of 3 total doses. If no relief after 1 dose, call 911.     Current Medications:     Scheduled Meds:    bumetanide  1 mg Oral BID    potassium bicarb-citric acid 20 mEq Oral Daily    midodrine  2.5 mg Oral BID WC    sodium bicarbonate  1,300 mg Oral BID    pantoprazole  40 mg Oral QAM AC    sodium chloride  20 mL Intravenous Once    sodium chloride flush  10 mL Intravenous 2 times per day    allopurinol  100 mg Oral Daily    amiodarone  400 mg Oral Daily    atorvastatin  20 mg Oral Daily    docusate sodium  100 mg Oral Daily    levothyroxine  100 mcg Oral Daily    metoprolol tartrate  25 mg Oral BID     Continuous Infusions:     PRN Meds:  zolpidem, albuterol, sodium chloride flush, acetaminophen, polyethylene glycol, promethazine **OR** ondansetron, fentanNYL    Input/Output:       I/O last 3 completed shifts: In: 710 [P.O.:690; I.V.:20]  Out: 1025 [Urine:1025]. Patient Vitals for the past 96 hrs (Last 3 readings):   Weight   20 0336 215 lb 11.2 oz (97.8 kg)   20 0515 231 lb (104.8 kg)   20 0317 231 lb (104.8 kg)       Vital Signs:   Temperature:  Temp: 98 °F (36.7 °C)  TMax:   Temp (24hrs), Av.9 °F (36.6 °C), Min:97.6 °F (36.4 °C), Max:98.3 °F (36.8 °C)    Respirations:  Resp: 22  Pulse:   Pulse: 80  BP:    BP: (!) 112/55  BP Range: Systolic (12VFX), AQV:696 , Min:109 , ZLE:006       Diastolic (73WJI), UVY:94, Min:55, Max:67      Physical Examination:     General:  Awake, nontoxic, hard of hearing  HEENT: NC/AT/ MMM   Chest:               diminished  Cardiac:  irregular  Abdomen: Soft, non-tender,  Neuro:  No facial droop, No Asterixis  SKIN:  No rashes, good skin turgor.   Extremities:  2+ LE edema, no cyanosis    Labs:       Recent Labs     20  0542 20  1835 20  0605   WBC 6.9  --   --    RBC 2.47*  --   --    HGB 8.2* 8.7* 8.3*   HCT 26.7* 27.9* 26.1*   .1*  --   --    MCH 33.2*  --   --    MCHC 30.7*  --   --      --   --    MPV 12.6*  --   --       BMP:   Recent Labs     20  0059 20  0542 20  0605    141 144   K 4.5 4.4 3.7    110 111   CO2 17* 19* 21*   BUN 61* 61* fall    If discharged today check a BMP, CBC, iron saturation in 1 week and follow up with me in office in 2 weeks, I can see him in [address] [place] [address] or [place] if easier for the patient. Please don't hesitate to call with any questions.   Electronically signed by Jay Garcia DO on 8/21/2020 at 10:53 AM

## 2020-08-21 NOTE — PROGRESS NOTES
Discharge teaching and instructions for diagnosis/procedure of retroperitoneal bleed completed with patient using teachback method. AVS reviewed. Printed prescriptions given to patient. Patient voiced understanding regarding prescriptions, follow up appointments, and care of self at home.  Discharged in a wheelchair to  [facility] [address] [facility] per their staff

## 2020-08-21 NOTE — TELEPHONE ENCOUNTER
Please contact patient and schedule follow up with in our [facility] office, patient denies history of BPH, but complained of nocturia several times per night during exam.  Patient has 8/21 plan of discharge.   thanks

## 2020-08-21 NOTE — PROGRESS NOTES
[address] STEPDOWN TELEMETRY 4K  [address] [facility] [address]  Dept: [phone]  Loc: [phone]  Visit Date: 2020  Urology Progress Note    Chief Complaint: trauma from fall    Subjective: \"  Patient is resting in bed, voiding voiding clear, yellow urine via alejandro catheter, +flatus, +BM, up with assitance, tolerating regular diet, denies any nausea or vomiting. There are complaints of no pain at this time. Urologic history:  Patient denies having a urologist, denies having freq, urgency, some nocturia up 4X per night.   Agrees to follow up for nocturia        Vitals:  BP (!) 120/56   Pulse 79   Temp 97.7 °F (36.5 °C) (Oral)   Resp 22   Ht 6' (1.829 m)   Wt 231 lb (104.8 kg)   SpO2 95%   BMI 31.33 kg/m²   Temp  Av.8 °F (36.6 °C)  Min: 97.6 °F (36.4 °C)  Max: 98.3 °F (36.8 °C)    Intake/Output Summary (Last 24 hours) at 2020 0236  Last data filed at 2020 2045  Gross per 24 hour   Intake 760 ml   Output 1300 ml   Net -540 ml       Social History     Socioeconomic History    Marital status: Single     Spouse name: Not on file    Number of children: Not on file    Years of education: Not on file    Highest education level: Not on file   Occupational History    Not on file   Social Needs    Financial resource strain: Not on file    Food insecurity     Worry: Not on file     Inability: Not on file   [employer] needs     Medical: Not on file     Non-medical: Not on file   Tobacco Use    Smoking status: Never Smoker    Smokeless tobacco: Never Used   Substance and Sexual Activity    Alcohol use: Never     Frequency: Never    Drug use: Never    Sexual activity: Never   Lifestyle    Physical activity     Days per week: Not on file     Minutes per session: Not on file    Stress: Not on file   Relationships    Social connections     Talks on phone: Not on file     Gets together: Not on file     Attends Rastafarian service: Not on file     Active member of club or organization: Not on file     Attends meetings of clubs or organizations: Not on file     Relationship status: Not on file    Intimate partner violence     Fear of current or ex partner: Not on file     Emotionally abused: Not on file     Physically abused: Not on file     Forced sexual activity: Not on file   Other Topics Concern    Not on file   Social History Narrative    Not on file     No family history on file. Allergies   Allergen Reactions    Zestril [Lisinopril] Other (See Comments)     cough       Objective:    Constitutional: Alert and oriented times x3, no acute distress, and cooperative to examination with appropriate mood and affect. HEENT:   Head:         Normocephalic and atraumatic. Mucous membranes are normal.   Eyes:         EOM are normal. No scleral icterus. Nose:    The external appearance of the nose is normal  Ears: The ears appear normal to external inspection. Cardiovascular:       Normal rate, regular rhythm. Pulmonary/Chest:  Normal respiratory rate and rhthym. No use of accessory muscles. Lungs clear bilaterally. Abdominal:          Soft. No tenderness. Active bowel sounds. Genitalia:    Solis catheter draining clear, yellow urine  Musculoskeletal:    Normal range of motion. He exhibits no edema or tenderness of lower extremities. Extremities:    No cyanosis, clubbing, or edema present. Neurological:    Alert and oriented.      Labs:  WBC:    Lab Results   Component Value Date    WBC 6.9 08/20/2020     Hemoglobin/Hematocrit:    Lab Results   Component Value Date    HGB 8.7 08/20/2020    HCT 27.9 08/20/2020     BMP:    Lab Results   Component Value Date     08/20/2020    K 4.4 08/20/2020    K 4.8 08/15/2020     08/20/2020    CO2 19 08/20/2020    BUN 61 08/20/2020    LABALBU 3.2 08/15/2020    CREATININE 2.9 08/20/2020    CALCIUM 8.6 08/20/2020    LABGLOM 21 08/20/2020       Impression:  Fall  CALVIN on CKD  Traumatic retroperitoneal hematoma  Hematoma of right kidney  Acute blood loss anemia        Plan:   Will treat conservatively, no acute intervention planned by urology  Urology will watch creatinine closely - currently 2.9 up from 2.3  Continue to monitor H&H - 8.2 currently  Transfuse as needed  Will remove alejandro and perform a void trial - ok to re insert alejandro and keep until follow up if patient unable to void  Will schedule follow up in our [facility] office, will contact patient    MENDEL Lara  08/21/20 2:36 AM  Urology

## 2020-08-21 NOTE — PROGRESS NOTES
Comprehensive Nutrition Assessment    Type and Reason for Visit:  Initial(LOS eval, needs ONS)    Nutrition Recommendations/Plan:   Continue general diet. Send nepro twice/day. Consider folbee plus. Nutrition Assessment:     Pt. nutritionally compromised AEB  Inadequate oral intake. At risk for further nutrition compromise r/t  Admitted with retroperitoneal bleeding, CALVIN on CKD, Metabolic Acidosis, CHF, Afib,increased nutrient needs for wound healing, underlying medical condition  Hx:(CAD s/p CABG). Nutrition recommendations/interventions as per above. Malnutrition Assessment:  Malnutrition Status:   Insufficient data    Findings of the 6 clinical characteristics of malnutrition:  Energy Intake:  (varied 0-100%)  Weight Loss:  No significant weight loss     Body Fat Loss:  Unable to assess     Muscle Mass Loss:  Unable to assess    Fluid Accumulation:     unable to assess   Strength:  Not Performed    Estimated Daily Nutrient Needs:  Energy (kcal):  1956-2445kcals ( 20-25kcals/kgm); Weight Used for Energy Requirements:  (97.8kgm)     Protein (g):  ~105 grams(1.3 grams /kgm IBW) as renal function toelrates; Weight Used for Protein Requirements:  Ideal(81kgm)        Nutrition Related Findings:  Pt seen, appears very [place], mentions appetite poor ~ 1 week. He denies chewing/swallowing difficulty. Labs: (8/21) BUN 57, Creatinine 2.6, Ca 8.4, Hemoglobin 8.3. Meds: Bumex, Colace, Glycolax. Pt mentions had a BM today ( not documented). Pt unable to give a diet hx.       Wounds:  (buttocks stage 2 rt)       Current Nutrition Therapies:    DIET GENERAL;  Dietary Nutrition Supplements: Renal Oral Supplement    Anthropometric Measures:  · Height: 6' (182.9 cm)  · Current Body Weight: 215 lb 11.2 oz (97.8 kg)(+ 2 RLE & LLE edema)   · Admission Body Weight: 212 lb 4.9 oz (96.3 kg)    · Usual Body Weight: (no previous wts pt very [place] states 200# unsure when)     · Ideal Body Weight: 178 lbs; % Ideal Body Weight · BMI: 29.2  ·   · BMI Categories: Overweight (BMI 25.0-29. 9)       Nutrition Diagnosis:   · Inadequate oral intake related to catabolic illness as evidenced by intake 0-25%, intake 26-50%      Nutrition Interventions:   Food and/or Nutrient Delivery:  Continue Current Diet, Start Oral Nutrition Supplement  Nutrition Education/Counseling:  Education not indicated   Coordination of Nutrition Care:  Continued Inpatient Monitoring    Goals:  Pt will consume 75% or more during LOS. Nutrition Monitoring and Evaluation:   Behavioral-Environmental Outcomes:      Food/Nutrient Intake Outcomes:  Food and Nutrient Intake, Supplement Intake, Vitamin/Mineral Intake  Physical Signs/Symptoms Outcomes:  Biochemical Data, GI Status, Weight, Skin, Hemodynamic Status, Nutrition Focused Physical Findings, Fluid Status or Edema     Discharge Planning:     Too soon to determine     Electronically signed by Ange Lake RD, LD on 8/21/20 at 1:13 PM EDT    Contact: [phone]

## 2020-08-21 NOTE — CARE COORDINATION
8/17/20, 2:52 PM EDT    DISCHARGE PLANNING EVALUATION      [facility] Full assessment deferred at this time. Patient is a retired  and lives at the [facility], now uses for retired priests, either in the assisted living area or [facility]. SW spoke to Kimberly with the [facility]. They are not a SNF but can provide care for him. He recently was discharged from therapy. She states they should be able to take him back. If he needs therapy again, it will require a HH order as they get their services from [facility].
8/18/20, 11:24 AM EDT    [place] day: 5  Location: -23/023-A Reason for admit: Retroperitoneal bleeding [R58]   Procedure:   CT at [facility] reported large retroperitoneal hemorrhage/Dr Espinosa Son  08/15 Renal US  1. Heterogeneous right perinephric collection correlate with perinephric hematoma. 2. No obstructive uropathy present. 3. Right pleural effusion present. 4. Left upper quadrant free fluid present. 08/16 CXR  The lung volumes are slightly diminished. There is slightly worsened opacity at the right lung base which most commonly represents a small right pleural effusion and atelectasis/infiltrate. The lateral aspect of the left lower chest is partially   disturbed by the overlying generator device. Given this limitation, there is slightly worsened opacity within the left lower chest with the differential including pleural fluid and atelectasis/infiltrates. There is no pulmonary vascular congestion. Follow-up chest radiographs recommended to confirm complete resolution. 8/18 Imaging  1. Large right-sided retroperitoneal hematoma. This has decreased in size compared to examination from 3 days ago. 2. There is a component which surrounds, and possibly compresses, the right kidney. Question with patient's creatinine is recommended. No underlying mass lesion is identified on this noncontrast exam.   3. Enlarging moderate-sized bilateral pleural effusions with bibasilar compressive atelectasis.    Treatment Plan of Care: from ICU prior, client here with retroperitoneal bleed and fall; Nephrology/Palliative care following  Barriers to Discharge: Creatinine 3 (2.9) worse, H 8.2; monitoring H  PCP: Karely Mckenna  Readmission Risk Score: 14%  Patient Goals/Plan/Treatment Preferences: plans to return [facility]; therapy following; await their input
8/19/20, 11:14 AM EDT    DISCHARGE PLANNING EVALUATION      OPAL spoke to Nidia Finch with [facility] to again verify that Marika Yip can return to same setting. She states that he is on the first floor. They have a resident on the second floor who is positive for COVID who is in isolation as well as another resident who is in isolation due to precaution. They should not have any interaction with Marika Yip. SW advised that he is to work with therapy and determine his abiltiy. She states they have 3 staff on during the day and 2 at night. She states that all residents wear a call pendent and he had been able to use this in the past. They feel they still will be able to take him back.  OPAL updated the nurse and CM
8/21/20, 2:30 PM EDT  Patient is discharged today by facility transport with [facility]. Referral was made to [facility]. OPAL faxed chart information to them at [phone]  Patient goals/plan/ treatment preferences discussed by  and . Patient goals/plan/ treatment preferences reviewed with patient/ family. Patient/ family verbalize understanding of discharge plan and are in agreement with goal/plan/treatment preferences. Understanding was demonstrated using the teach back method. AVS provided by RN at time of discharge, which includes all necessary medical information pertaining to the patients current course of illness, treatment, post-discharge goals of care, and treatment preferences.     Services After Discharge  Services At/After Discharge: PT, OT([facility])   IMM Letter  IMM Letter given to Patient/Family/Significant other/Guardian/POA/by[de-identified]   IMM Letter date given[de-identified] 08/21/20  IMM Letter time given[de-identified] [id number]
8/21/20, 9:16 AM EDT    DISCHARGE PLANNING EVALUATION      SW spoke to Mohini with [facility]. They are able to take back today, chart information was faxed. They would like a report--[phone]. Referral was made to [address] [place] [address], spoke to Karlo Astudillo and chart information was faxed to her at [phone]. OPAL updated the nurse.
Update: texted Attending for [facility] order (nsg, aide, therapy); await reply, SW following for [facility]  Electronically signed by Grant Masters RN on 8/21/2020 at 9:24 AM  Update: therapy orders in Epic; collaborated with Laurita Child  Electronically signed by Grant Masters RN on 8/21/2020 at 12:34 PM
Update: texted Attending for activity level, await reply  Electronically signed by Adrián Bay RN on 8/19/2020 at 9:10 AM  Update: activity level in Epic now, therapy updated to see for recommendations  Electronically signed by Adrián Bay RN on 8/19/2020 at 10:21 AM
Intravenous Once    midodrine  5 mg Oral TID WC    sodium chloride flush  10 mL Intravenous 2 times per day    famotidine (PEPCID) injection  20 mg Intravenous BID    allopurinol  100 mg Oral Daily    amiodarone  400 mg Oral Daily    atorvastatin  20 mg Oral Daily    docusate sodium  100 mg Oral Daily    levothyroxine  100 mcg Oral Daily    metoprolol tartrate  25 mg Oral BID     Continuous Infusions:   Pertinent Info/Orders/Treatment Plan: To ED with recent fall; tx to ICU and then to  23  Telemetry, strict bedrest, nephrology consulted, palliative care consulted, Hgb and Hct every 6 hours, last Hgb 6.8/hct 22.3, BUN 95, creatinine 3.1, Vit K, on room air, blood transfusions as ordered,     Diet: DIET GENERAL;   Smoking status:  reports that he has never smoked. He has never used smokeless tobacco.   PCP: Zach Lima  Readmission 30 days or less: no  Readmission Risk Score: 16%    Discharge Planning Evaluation  Current Residence:  Assisted living  Living Arrangements:  [place]:  Family Members  Current Services PTA:     Potential Assistance Needed:     Potential Assistance Purchasing Medications:  No  Does patient want to participate in local refill/ meds to beds program?  No  Type of Home Care Services:  None  [facility] [address] [place] [address]  Expected Discharge date:  08/21/20  Follow Up Appointment: Best Day/ Time: Monday PM    Patient Goals/Plan/Treatment Preferences: Patient lives at [facility]; he has needed DME, has PCP, and plans to return to [address] [place] [address]. SW following. Transportation/Food Security/Housekeeping Addressed:  No issues identified.     Evaluation: yes
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
## 2020-09-17 NOTE — DISCHARGE SUMMARY
Discharge Summary   Trauma Services    Patient Identification:  Lokesh Seay  : 1930  MRN: [id number]   Account: [de-identified]     Admit date: 2020  Discharge date: 20  Attending provider: Dr. Li Burns   Primary care provider: Carley Bagley     Discharge Diagnoses: Active Problems:    Retroperitoneal bleeding    Acute blood loss anemia    Fall    Traumatic retroperitoneal hematoma    Hematoma of right kidney    Acute kidney injury superimposed on CKD (HCC)    Hypotension  Resolved Problems:    * No resolved hospital problems. *    Hospital Course:   Lokesh Seay is a 80 y.o. male admitted to [address] [place] [address] on 2020 for injuries likely related to a fall which occurred 12 days prior to arrival. Patient with a history of Afib on Coumadin. The patient was initially taken to [facility] emergency department for evaluation after he began to have sudden onset right flank pain and associated dizziness while sitting in [place] on . In the ED at [facility], the patient became hypotensive and was sent for a CT scan of his abdomen and pelvis without contrast which revealed right retroperitoneal and perinephric hematomas. The patient was also noted to have a Hgb of 7. The patient was given Kcentra, Vitamin K, 1 unit PRBCs and 3L normal saline, and a alejandro was placed prior to being transferred to [address] [place] [address] ED as a Level I Trauma. Upon arrival to [facility], the patient was admitted to the ICU under Trauma services with Intensivist  and Nephrology consults, as he was hypotensive and noted to have an CALVIN as well. The patient's oral anticoagulants were held. He received an additional 1 unit PRBCs following admission, and his Hgb following this transfusion was 7.3. The patient continued to be hypotensive. Midodrine was started per the Nephrologist. The patient remained stable and was able to be transferred out of the ICU to [facility] on .  At that time the Intensivist signed off care to the Hospitalist service. On 8/17 the patient was noted to be somewhat somnolent on exam, presumed to be related to him having been given Ambien the night before. The patient's Hgb was noted to be 6.8 and another transfusion of 1 unit PRBCs was ordered. A repeat CT abdomen and pelvis was ordered for the following day to evaluate progression of the hematomas. Imaging revealed that the large right-sided retroperitoneal hematoma had somewhat decreased in size, however, a component of the hematoma was surrounding the right kidney and possibly compressing it, causing the patient's kidney function to worsen. Urology was consulted to evaluate and recommended no acute intervention, conservative treatment and continue to trend labs. On 8/20 the patient's Hgb was stable at 8.2. He continued to complain of right sided abdominal and flank pain but was able to work with therapy and voiced that he felt it was helping him. Urology signed off, recommending removal of the patient's alejandro and a void trial. The patient ultimately failed his void trial and Urology recommended he be discharged with a alejandro in place and follow up outpatient in their clinic. Nephrology was agreeable with this plan and signed off as well, recommending Bumex at discharge. On the day of discharge, the patient reported feeling fine. Hgb and renal function remained stable. All consulting services had signed off and were ok with discharge. The patient remained stable from a Trauma perspective and was able to be discharged back to [facility] with [address], aid, and PT/OT at discharge as well as instructions to continue holding anticoagulants until follow up, as well as instructions to repeat labs in 1 week and follow up with Urology, Nephrology, PCP, and Trauma.     Discharge Medications:   Hang Primas   Home Medication Instructions AVN:[id number]    Printed on:09/17/20 0209   Medication Information                      acetaminophen (TYLENOL) 500 MG tablet  Take 500 mg by mouth every 6 hours as needed for Pain             albuterol (PROVENTIL) (2.5 MG/3ML) 0.083% nebulizer solution  Take 6 mLs by nebulization every 4 hours as needed for Wheezing or Shortness of Breath             allopurinol (ZYLOPRIM) 100 MG tablet  Take 100 mg by mouth daily             amiodarone (CORDARONE) 200 MG tablet  Take 400 mg by mouth daily             atorvastatin (LIPITOR) 20 MG tablet  Take 20 mg by mouth daily             bumetanide (BUMEX) 1 MG tablet  Take 1 tablet by mouth 2 times daily             docusate sodium (COLACE, DULCOLAX) 100 MG CAPS  Take 100 mg by mouth daily             finasteride (PROSCAR) 5 MG tablet  Take 1 tablet by mouth daily             levothyroxine (SYNTHROID) 100 MCG tablet  Take 1 tablet by mouth Daily             meclizine (ANTIVERT) 12.5 MG tablet  Take 12.5 mg by mouth 3 times daily as needed for Dizziness             metoprolol tartrate (LOPRESSOR) 25 MG tablet  Take 1 tablet by mouth 2 times daily             midodrine (PROAMATINE) 2.5 MG tablet  Take 1 tablet by mouth 2 times daily (with meals)             nitroGLYCERIN (NITROSTAT) 0.4 MG SL tablet  Place 0.4 mg under the tongue every 5 minutes as needed for Chest pain up to max of 3 total doses. If no relief after 1 dose, call 911.              pantoprazole (PROTONIX) 40 MG tablet  Take 1 tablet by mouth every morning (before breakfast)             polyethylene glycol (GLYCOLAX) 17 g packet  Take 17 g by mouth daily as needed for Constipation             potassium bicarb-citric acid (EFFER-K) 20 MEQ TBEF effervescent tablet  Take 1 tablet by mouth daily             sodium bicarbonate 650 MG tablet  Take 2 tablets by mouth 2 times daily                 Patient Instructions:    Discharge lab work: BMP, CBC, iron saturation in 1 week  Activity: Activity as tolerated with assistance  Diet: No diet orders on file    Code Status: Prior    Follow-up visits:   Batsheva Petty Bennie Cruz [address]  [phone]    Go on 8/26/2020  Follow up with Primary Care Physician, Your appointment time is at 1:45PM, Please arrive 15 minutes prior to appointment time, Bring medications, photo ID, & insurance card    CM STR [facility]  [address] [facility], [place] [address] [address]    Go on 9/3/2020  Urology, Your appointment time is at [address] [place] [address] 21, Please arrive 15 minutes prior to appointment time, Bring medications, photo ID, & insurance card    Hussain Fang PA-C  [address] [facility] [address] [facility] [address]  [phone]    Go on 8/28/2020  Trauma services, Your appointment time is at [place] [address], Please arrive 15 minutes prior to appointment time, Bring medications, photo ID, & insurance card    Lois Renee, DO  [address] [address] [address]  [phone]    Go on 9/18/2020  Nephrology, Your appointment time is at 10:20AM, Bring medications, photo ID, & insurance card, Please arrive 15 minutes prior to appointment time       Procedures: None    Consults:   Intensivist, Nephrology, Urology, Hospitalist, Palliative care    Examination:  Vitals:  Vitals:    08/21/20 0353 08/21/20 0939 08/21/20 1146 08/21/20 1213   BP: 111/67 (!) 112/55 130/65    Pulse: 80 80 80    Resp: 20 22 24    Temp: 97.6 °F (36.4 °C) 98 °F (36.7 °C) 97.4 °F (36.3 °C)    TempSrc: Oral Oral Oral    SpO2: 96% 97% 97% 98%   Weight:       Height:         Weight: Weight: 215 lb 11.2 oz (97.8 kg)     24 hour intake/output:No intake or output data in the 24 hours ending 09/17/20 0209    GENERAL: Awake, alert, sitting upright in bed eating breakfast.  NEURO: Follows commands. ZABALA spontaneously. CARDIO: Regular rate and rhythm with no murmurs. Peripheral pulses intact. PULMONARY: Lungs clear to auscultation bilaterally with normal work of breathing. Diminished at bases bilaterally.   ABDOMEN: Soft, nontender, nondistended with normoactive bowel sounds. MSK: No cyanosis and no clubbing. Significant Diagnostics:   Radiology: Us Renal Complete    Result Date: 8/15/2020  PROCEDURE: US RENAL COMPLETE CLINICAL INFORMATION: fatoumata on ckd. COMPARISON: No prior study. TECHNIQUE: Grayscale and color images were obtained of both kidneys. FINDINGS: Limited exam detail secondary to suboptimal acoustic window. RIGHT KIDNEY - 8.7 x 5.3 x 4.8 cm Resistive Index - 0.67 Cortical Thickness - 1.1 cm. There is a perinephric heterogeneous fluid collection that measures 7.1 x 7.5 x 2.4 cm with slightly heterogeneous appearance. This suggests the perinephric hematoma previously described per history. There is no visualized obstructive uropathy. LEFT KIDNEY - 9.4 x 5.5 x 4.6 cm Resistive Index - 0.64 Cortical Thickness - 1.2 cm There is normal echogenicity of the renal parenchyma bilaterally. There is no thinning of the renal cortex. There is no hydronephrosis. No stones are noted. The urinary bladder is decompressed by a Solis catheter the detail is limited. There is noted right pleural effusion partially visualized. There is free fluid in the left upper quadrant, correlate with ascites versus hematoma. 1. Heterogeneous right perinephric collection correlate with perinephric hematoma. 2. No obstructive uropathy present. 3. Right pleural effusion present. 4. Left upper quadrant free fluid present. **This report has been created using voice recognition software. It may contain minor errors which are inherent in voice recognition technology. ** Final report electronically signed by Dr. Awilda Huizar on 8/15/2020 6:33 AM    Ct Interpretation Of Outside Images    Result Date: 8/15/2020  PROCEDURE: CT INTERPRETATION OF OUTSIDE IMAGES CLINICAL INFORMATION: Trauma transfer. COMPARISON: No prior study. TECHNIQUE: Interpretation of an outside CT examination of the abdomen/pelvis without contrast from [facility]. FINDINGS: LIMITATIONS: 1.  The lack of intravenous contrast limits evaluation of the solid organs. LUNG BASES: 1. There are bilateral pleural effusions, small to moderate on the left and small on the right with associated consolidation within the lower chest bilaterally. 2. There is a subcentimeter noncalcified nodule within the right lower chest. 3. There is mild cardiomegaly. 4. There is a pacemaker/AICD device. 5. There is a small amount of gas along the anterior aspect of the heart to the right of midline. There is no pneumothorax. 6. Coronary artery calcifications are present however not quantified. There is also calcification along the aortic valve. LIVER/GALLBLADDER/BILIARY TREE: Unremarkable. PANCREAS/SPLEEN: 1. There is fatty infiltration of the pancreas which is otherwise unremarkable. 2. The unopacified spleen is unremarkable. ADRENAL GLANDS/KIDNEYS: 1. The bilateral adrenal glands are unremarkable. 2. The left kidney is atrophic. 3. There is enlargement of the right kidney with mixed attenuation throughout the right kidney. The high attenuation seen more peripherally suggesting a subcapsular hematoma. There is also a large amount of hemorrhage within the right perinephric space, right posterior perirenal space and down within the right abdomen/retroperitoneum with extension inferiorly into the right pelvis. The largest component of the hematoma is within the right abdomen/retroperitoneum tracking inferiorly into the right pelvis  measuring 14.5 x 9.6 x 7.3 cm in longitudinal, transverse and AP dimensions. There is also hemorrhage tracking medially within the abdomen and proximal pelvis. In addition, there is fluid suggesting hemorrhage adjacent to the spleen and tracking inferiorly along the left colonic gutter. As previously noted no obvious splenic laceration is identified on the noncontrasted imaging. BOWEL: 1. The bowel gas pattern is nonobstructive. The ascending colon is not seen in its entirety and may be of secured resected.  There are a few mildly dilated loops of small bowel and small air-fluid levels within a few loops of small bowel and also within the transverse colon. Findings suggest an ileus. FREE AIR/FREE FLUID/INFLAMMATION: 1. As previously described. 2. There is no free air. LYMPHADENOPATHY: 1. No pathologically enlarged lymph nodes. ABDOMINAL AORTA: 1. Atheromatous calcification throughout the abdominal aorta, iliac, common femoral, superficial femoral and profunda femoral arteries. 2. There is atheromatous calcification within the celiac trunk and superior mesenteric artery. 3. There is ectasia of the right common iliac artery. ABDOMINAL WALL: 1. There are stranding densities within the subcutaneous tissues suggesting edema which may be related to 3rd spacing. 2. There is gynecomastia bilaterally. MUSCULOSKELETAL: 1. The bony structures are osteopenic. 2. There is levoscoliosis of the lumbar spine. There is 0.7 cm anterolisthesis of L4 on L5. There is a compression fracture of the L1 vertebral body with approximately 30-40% loss of superior height which most likely is chronic. 3. Multilevel degenerative changes. 4. There are syndesmophytes which can be associated with ankylosing spondylitis. OTHER: None. 1. There is enlargement of the right kidney with mixed attenuation throughout the right kidney. The high attenuation seen more peripherally suggesting a subcapsular hematoma. There is also a large amount of hemorrhage within the right perinephric space, right posterior perirenal space and down within the right abdomen/retroperitoneum with extension inferiorly into the right pelvis. The largest component of the hematoma is within the right abdomen/retroperitoneum tracking inferiorly into the right pelvis  measuring 14.5 x 9.6 x 7.3 cm in longitudinal, transverse and AP dimensions. There is also hemorrhage tracking medially within the abdomen and proximal pelvis.  In addition, there is fluid suggesting hemorrhage adjacent to the spleen and tracking inferiorly along the left colonic gutter. As previously noted no obvious splenic laceration is identified on the noncontrasted imaging. 2. There are bilateral pleural effusions, small to moderate on the left and small on the right with associated consolidation within the lower chest bilaterally. 3.  The bowel gas pattern is nonobstructive. The ascending colon is not seen in its entirety and may be of secured resected. There are a few mildly dilated loops of small bowel and small air-fluid levels within a few loops of small bowel and also within the transverse colon. Findings suggest an ileus. 4. Numerous additional findings as described in the body of the report. 5. Correlation should be made with the report from [facility] to exclude additional findings. This report was not available at the time of interpretation. **This report has been created using voice recognition software. It may contain minor errors which are inherent in voice recognition technology. ** Final report electronically signed by Dr. Sagrario Prasad on 8/15/2020 10:03 AM    Xr Comparison Of Outside Films    Result Date: 8/15/2020  Radiology exam is complete. No Radiologist dictation. Please follow up with ordering provider. Labs: No results found for this or any previous visit (from the past 72 hour(s)). Discharge condition: Stable  Disposition:  To a non-[facility] facility, [facility]  Time spent on discharge: >60 minutes     Electronically signed by Jackson Mccarthy PA-C on 9/17/2020 at 2:09 AM